# Patient Record
Sex: FEMALE | Race: WHITE | NOT HISPANIC OR LATINO | Employment: UNEMPLOYED | URBAN - METROPOLITAN AREA
[De-identification: names, ages, dates, MRNs, and addresses within clinical notes are randomized per-mention and may not be internally consistent; named-entity substitution may affect disease eponyms.]

---

## 2017-05-03 ENCOUNTER — HOSPITAL ENCOUNTER (EMERGENCY)
Facility: CLINIC | Age: 42
Discharge: HOME | End: 2017-05-03
Attending: FAMILY MEDICINE | Admitting: FAMILY MEDICINE
Payer: COMMERCIAL

## 2017-05-03 VITALS
RESPIRATION RATE: 16 BRPM | OXYGEN SATURATION: 99 % | WEIGHT: 230 LBS | BODY MASS INDEX: 38.87 KG/M2 | HEART RATE: 81 BPM | DIASTOLIC BLOOD PRESSURE: 86 MMHG | TEMPERATURE: 97.8 F | SYSTOLIC BLOOD PRESSURE: 148 MMHG

## 2017-05-03 DIAGNOSIS — G43.109 MIGRAINE WITH AURA AND WITHOUT STATUS MIGRAINOSUS, NOT INTRACTABLE: ICD-10-CM

## 2017-05-03 LAB
ANION GAP SERPL CALCULATED.3IONS-SCNC: 5 MMOL/L (ref 3–14)
BASOPHILS # BLD AUTO: 0 10E9/L (ref 0–0.2)
BASOPHILS NFR BLD AUTO: 0.2 %
BUN SERPL-MCNC: 8 MG/DL (ref 7–30)
CALCIUM SERPL-MCNC: 9.2 MG/DL (ref 8.5–10.1)
CHLORIDE SERPL-SCNC: 111 MMOL/L (ref 94–109)
CO2 SERPL-SCNC: 25 MMOL/L (ref 20–32)
CREAT SERPL-MCNC: 0.63 MG/DL (ref 0.52–1.04)
DIFFERENTIAL METHOD BLD: NORMAL
EOSINOPHIL # BLD AUTO: 0.1 10E9/L (ref 0–0.7)
EOSINOPHIL NFR BLD AUTO: 0.9 %
ERYTHROCYTE [DISTWIDTH] IN BLOOD BY AUTOMATED COUNT: 12.5 % (ref 10–15)
GFR SERPL CREATININE-BSD FRML MDRD: ABNORMAL ML/MIN/1.7M2
GLUCOSE SERPL-MCNC: 111 MG/DL (ref 70–99)
HCT VFR BLD AUTO: 41.8 % (ref 35–47)
HGB BLD-MCNC: 14.1 G/DL (ref 11.7–15.7)
IMM GRANULOCYTES # BLD: 0 10E9/L (ref 0–0.4)
IMM GRANULOCYTES NFR BLD: 0.2 %
LYMPHOCYTES # BLD AUTO: 1.5 10E9/L (ref 0.8–5.3)
LYMPHOCYTES NFR BLD AUTO: 16.5 %
MCH RBC QN AUTO: 30.9 PG (ref 26.5–33)
MCHC RBC AUTO-ENTMCNC: 33.7 G/DL (ref 31.5–36.5)
MCV RBC AUTO: 92 FL (ref 78–100)
MONOCYTES # BLD AUTO: 0.3 10E9/L (ref 0–1.3)
MONOCYTES NFR BLD AUTO: 3.2 %
NEUTROPHILS # BLD AUTO: 7.2 10E9/L (ref 1.6–8.3)
NEUTROPHILS NFR BLD AUTO: 79 %
NRBC # BLD AUTO: 0 10*3/UL
NRBC BLD AUTO-RTO: 0 /100
PLATELET # BLD AUTO: 282 10E9/L (ref 150–450)
POTASSIUM SERPL-SCNC: 4.1 MMOL/L (ref 3.4–5.3)
RBC # BLD AUTO: 4.57 10E12/L (ref 3.8–5.2)
SODIUM SERPL-SCNC: 141 MMOL/L (ref 133–144)
WBC # BLD AUTO: 9.1 10E9/L (ref 4–11)

## 2017-05-03 PROCEDURE — 85025 COMPLETE CBC W/AUTO DIFF WBC: CPT | Performed by: FAMILY MEDICINE

## 2017-05-03 PROCEDURE — 96375 TX/PRO/DX INJ NEW DRUG ADDON: CPT | Performed by: FAMILY MEDICINE

## 2017-05-03 PROCEDURE — 99284 EMERGENCY DEPT VISIT MOD MDM: CPT | Mod: Z6 | Performed by: FAMILY MEDICINE

## 2017-05-03 PROCEDURE — 96374 THER/PROPH/DIAG INJ IV PUSH: CPT | Performed by: FAMILY MEDICINE

## 2017-05-03 PROCEDURE — 99284 EMERGENCY DEPT VISIT MOD MDM: CPT | Mod: 25 | Performed by: FAMILY MEDICINE

## 2017-05-03 PROCEDURE — 36415 COLL VENOUS BLD VENIPUNCTURE: CPT | Performed by: FAMILY MEDICINE

## 2017-05-03 PROCEDURE — 96361 HYDRATE IV INFUSION ADD-ON: CPT | Performed by: FAMILY MEDICINE

## 2017-05-03 PROCEDURE — 25000128 H RX IP 250 OP 636: Performed by: FAMILY MEDICINE

## 2017-05-03 PROCEDURE — 80048 BASIC METABOLIC PNL TOTAL CA: CPT | Performed by: FAMILY MEDICINE

## 2017-05-03 RX ORDER — SODIUM CHLORIDE 9 MG/ML
1000 INJECTION, SOLUTION INTRAVENOUS CONTINUOUS
Status: DISCONTINUED | OUTPATIENT
Start: 2017-05-03 | End: 2017-05-03 | Stop reason: HOSPADM

## 2017-05-03 RX ORDER — KETOROLAC TROMETHAMINE 30 MG/ML
30 INJECTION, SOLUTION INTRAMUSCULAR; INTRAVENOUS ONCE
Status: COMPLETED | OUTPATIENT
Start: 2017-05-03 | End: 2017-05-03

## 2017-05-03 RX ORDER — PROCHLORPERAZINE MALEATE 5 MG
5 TABLET ORAL EVERY 6 HOURS PRN
Qty: 10 TABLET | Refills: 0 | Status: SHIPPED | OUTPATIENT
Start: 2017-05-03 | End: 2020-12-07

## 2017-05-03 RX ADMIN — PROCHLORPERAZINE EDISYLATE 10 MG: 5 INJECTION INTRAMUSCULAR; INTRAVENOUS at 10:58

## 2017-05-03 RX ADMIN — KETOROLAC TROMETHAMINE 30 MG: 30 INJECTION, SOLUTION INTRAMUSCULAR at 10:52

## 2017-05-03 RX ADMIN — SODIUM CHLORIDE 1000 ML: 9 INJECTION, SOLUTION INTRAVENOUS at 10:50
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
## 2017-05-03 NOTE — DISCHARGE INSTRUCTIONS
Thank you for choosing [facility].     Please closely monitor for further symptoms. Return to the Emergency Department if you develop any new or worsening signs or symptoms.    If you received any opiate pain medications or sedatives during your visit, please do not drive for at least 8 hours.     Labs, cultures or final xray interpretations may still need to be reviewed.  We will call you if your plan of care needs to be changed.    Please follow up with your primary care physician or clinic.      * Migraine Headache  Migraine headaches are related to changes in blood flow to the brain. This causes throbbing or constant pain on one or both sides of the head. The pain may last from a few hours to several days. There is usually nausea, vomiting, sensitivity to light and sound, and blurred vision. A migraine attack may be triggered by emotional stress, hormone changes during the menstrual cycle, oral contraceptives, alcohol use, certain foods containing tyramine, eye strain, weather changes, missing meals, or too little or too much sleep.  Home Care For This Headache:  1) If you were given pain medicine for this headache, do not drive yourself home . Arrange for a ride, instead. When you get home, try to sleep. You should feel much better when you wake up.  2) Migraine headaches may improve with an ice pack on the forehead or at the base of the skull. Heat to the back of your neck may relieve any neck spasm.  3) Drink only clear liquids or eat a very light diet to avoid nausea/vomiting until symptoms improve.  Preventing Future Headaches:  1) Pay attention to those factors that seem to trigger your headache. Try to avoid them when you can. If you have frequent headaches, it is useful to keep a diary of what you were doing, feeling or eating in the hours before each attack. Show this to your doctor to help find the cause of your headaches.  a) If you feel that stress is a factor in your  headaches, look at the sources of stress in your life. Find ways to release the build-up of those stresses by using regular exercise, relaxation methods (yoga, meditation), bio-feedback or simply taking time-out for yourself. For more information about this, consult your doctor or go to a local bookstore and review books and tapes on this subject.  b) Tyramine is a substance present in the following foods : chocolate, yogurt, all cheeses except cottage cheese and cream cheese. smoked or pickled fish and meat (including herring, caviar, bologna, pepperoni, salami), liver, avocados, bananas, figs, raisins, and red wine. Be aware that these foods may trigger a migraine in some persons. Try taking these foods out of your diet for 1-2 months to see if this reduces headache frequency.  Treating Future Attacks:  1) At the first sign of a migraine headache, take a medicine to stop it if one has been prescribed for you. If not, take acetaminophen (Tylenol) or ibuprofen (Motrin, Advil) if you are able to take these. The sooner you take medicine, the better it will work.  2) You may also want to find a quiet, dark, comfortable place to sit or lie down. Let yourself relax or sleep.  3) An ice pack on the forehead or area of greatest pain may also help.  Follow Up  with your doctor if the headache is not better within the next 24 hours. If you have frequent headaches you should discuss a treatment plan with your primary care doctor. Ask if you can have medicine to take at home the next time you get a bad headache. Poorly controlled chronic headaches may require a referral to a neurologist (headache specialist).  Get Prompt Medical Attention  if any of the following occur:    Your head pain gets worse, or does not improve within 24 hours    Repeated vomiting (can t keep liquids down)    Sinus or ear or throat pain (not already reported)    Fever of 101  F (38.3  C) or higher, or as directed by your healthcare provider    Stiff  neck    Extreme drowsiness, confusion or fainting    Weakness of an arm or leg or one side of the face    Difficulty with speech or vision    6164-8318 [employer]. [address] [place] [address], [place] [address]. All rights reserved. This information is not intended as a substitute for professional medical care. Always follow your healthcare professional's instructions.

## 2017-05-03 NOTE — ED AVS SNAPSHOT
[facility], Emergency Department    [address]    [facility] [address]    Phone:  [phone]    Fax:  [phone]                                       Sabrina Mccoy   MRN: [id number]    Department:  [facility], Emergency Department   Date of Visit:  5/3/2017           After Visit Summary Signature Page     I have received my discharge instructions, and my questions have been answered. I have discussed any challenges I see with this plan with the nurse or doctor.    ..........................................................................................................................................  Patient/Patient Representative Signature      ..........................................................................................................................................  Patient Representative Print Name and Relationship to Patient    ..................................................               ................................................  Date                                            Time    ..........................................................................................................................................  Reviewed by Signature/Title    ...................................................              ..............................................  Date                                                            Time

## 2017-05-03 NOTE — ED AVS SNAPSHOT
[facility], Emergency Department    [address]    [facility] [place] [address]    Phone:  [phone]    Fax:  [phone]                                       Sabrina Mccoy   MRN: [id number]    Department:  [facility], Emergency Department   Date of Visit:  5/3/2017           Patient Information     Date Of Birth          1975        Your diagnoses for this visit were:     Migraine with aura and without status migrainosus, not intractable        You were seen by Joshua Evangelista MD.        Discharge Instructions       Thank you for choosing [facility].     Please closely monitor for further symptoms. Return to the Emergency Department if you develop any new or worsening signs or symptoms.    If you received any opiate pain medications or sedatives during your visit, please do not drive for at least 8 hours.     Labs, cultures or final xray interpretations may still need to be reviewed.  We will call you if your plan of care needs to be changed.    Please follow up with your primary care physician or clinic.      * Migraine Headache  Migraine headaches are related to changes in blood flow to the brain. This causes throbbing or constant pain on one or both sides of the head. The pain may last from a few hours to several days. There is usually nausea, vomiting, sensitivity to light and sound, and blurred vision. A migraine attack may be triggered by emotional stress, hormone changes during the menstrual cycle, oral contraceptives, alcohol use, certain foods containing tyramine, eye strain, weather changes, missing meals, or too little or too much sleep.  Home Care For This Headache:  1) If you were given pain medicine for this headache, do not drive yourself home . Arrange for a ride, instead. When you get home, try to sleep. You should feel much better when you wake up.  2) Migraine headaches may improve with an ice pack on the forehead or at the base of the skull. Heat  to the back of your neck may relieve any neck spasm.  3) Drink only clear liquids or eat a very light diet to avoid nausea/vomiting until symptoms improve.  Preventing Future Headaches:  1) Pay attention to those factors that seem to trigger your headache. Try to avoid them when you can. If you have frequent headaches, it is useful to keep a diary of what you were doing, feeling or eating in the hours before each attack. Show this to your doctor to help find the cause of your headaches.  a) If you feel that stress is a factor in your headaches, look at the sources of stress in your life. Find ways to release the build-up of those stresses by using regular exercise, relaxation methods (yoga, meditation), bio-feedback or simply taking time-out for yourself. For more information about this, consult your doctor or go to a local bookstore and review books and tapes on this subject.  b) Tyramine is a substance present in the following foods : chocolate, yogurt, all cheeses except cottage cheese and cream cheese. smoked or pickled fish and meat (including herring, caviar, bologna, pepperoni, salami), liver, avocados, bananas, figs, raisins, and red wine. Be aware that these foods may trigger a migraine in some persons. Try taking these foods out of your diet for 1-2 months to see if this reduces headache frequency.  Treating Future Attacks:  1) At the first sign of a migraine headache, take a medicine to stop it if one has been prescribed for you. If not, take acetaminophen (Tylenol) or ibuprofen (Motrin, Advil) if you are able to take these. The sooner you take medicine, the better it will work.  2) You may also want to find a quiet, dark, comfortable place to sit or lie down. Let yourself relax or sleep.  3) An ice pack on the forehead or area of greatest pain may also help.  Follow Up  with your doctor if the headache is not better within the next 24 hours. If you have frequent headaches you should discuss a treatment  plan with your primary care doctor. Ask if you can have medicine to take at home the next time you get a bad headache. Poorly controlled chronic headaches may require a referral to a neurologist (headache specialist).  Get Prompt Medical Attention  if any of the following occur:    Your head pain gets worse, or does not improve within 24 hours    Repeated vomiting (can t keep liquids down)    Sinus or ear or throat pain (not already reported)    Fever of 101  F (38.3  C) or higher, or as directed by your healthcare provider    Stiff neck    Extreme drowsiness, confusion or fainting    Weakness of an arm or leg or one side of the face    Difficulty with speech or vision    0331-9822 [employer]. [address] [place] [address], [place] [address]. All rights reserved. This information is not intended as a substitute for professional medical care. Always follow your healthcare professional's instructions.          24 Hour Appointment Hotline       To make an appointment at any [facility], call [phone] ([phone]). If you don't have a family doctor or clinic, we will help you find one. [facility] clinics are conveniently located to serve the needs of you and your family.             Review of your medicines      START taking        Dose / Directions Last dose taken    prochlorperazine 5 MG tablet   Commonly known as:  COMPAZINE   Dose:  5 mg   Quantity:  10 tablet        Take 1 tablet (5 mg) by mouth every 6 hours as needed for nausea or vomiting   Refills:  0          Our records show that you are taking the medicines listed below. If these are incorrect, please call your family doctor or clinic.        Dose / Directions Last dose taken    amitriptyline 10 MG tablet   Commonly known as:  ELAVIL   Quantity:  90 tablet        Start at 1 tab at bedtime for 3 days, then 2 tabs at bedtime for 3 days, then 3 tabs at bedtime.  Plan to increase dose to 50-75 mg at bedtime after 1 month   Refills:  0         hydrOXYzine 25 MG tablet   Commonly known as:  ATARAX   Dose:   mg   Quantity:  30 tablet        Take 2-4 tablets ( mg) by mouth nightly as needed for itching   Refills:  1        ibuprofen 600 MG tablet   Commonly known as:  ADVIL/MOTRIN   Dose:  600 mg   Quantity:  30 tablet        Take 1 tablet (600 mg) by mouth every 6 hours as needed for pain (mild)   Refills:  0                Prescriptions were sent or printed at these locations (1 Prescription)                   Other Prescriptions                Printed at Department/Unit printer (1 of 1)         prochlorperazine (COMPAZINE) 5 MG tablet                Procedures and tests performed during your visit     Basic metabolic panel    CBC with platelets differential      Orders Needing Specimen Collection     None      Pending Results     No orders found from 5/1/2017 to 5/4/2017.            Pending Culture Results     No orders found from 5/1/2017 to 5/4/2017.            Pending Results Instructions     If you had any lab results that were not finalized at the time of your Discharge, you can call the ED Lab Result RN at [phone]. You will be contacted by this team for any positive Lab results or changes in treatment. The nurses are available 7 days a week from 10A to 6:30P.  You can leave a message 24 hours per day and they will return your call.        Thank you for choosing [facility]       Thank you for choosing [facility] for your care. Our goal is always to provide you with excellent care. Hearing back from our patients is one way we can continue to improve our services. Please take a few minutes to complete the written survey that you may receive in the mail after you visit with us. Thank you!        [employer] Information     [facility] [employer] gives you secure access to your electronic health record. If you see a primary care provider, you can also send messages to your care team and make appointments. If you have questions, please call your primary care  clinic.  If you do not have a primary care provider, please call [phone] and they will assist you.        Care EveryWhere ID     This is your Care EveryWhere ID. This could be used by other organizations to access your [facility] medical records  [id number]        After Visit Summary       This is your record. Keep this with you and show to your community pharmacist(s) and doctor(s) at your next visit.

## 2018-03-31 ENCOUNTER — HEALTH MAINTENANCE LETTER (OUTPATIENT)
Age: 43
End: 2018-03-31

## 2019-04-03 ENCOUNTER — OFFICE VISIT (OUTPATIENT)
Dept: FAMILY MEDICINE | Facility: CLINIC | Age: 44
End: 2019-04-03
Payer: COMMERCIAL

## 2019-04-03 VITALS
DIASTOLIC BLOOD PRESSURE: 89 MMHG | HEIGHT: 64 IN | RESPIRATION RATE: 18 BRPM | TEMPERATURE: 98.2 F | HEART RATE: 80 BPM | SYSTOLIC BLOOD PRESSURE: 147 MMHG | BODY MASS INDEX: 40.29 KG/M2 | WEIGHT: 236 LBS | OXYGEN SATURATION: 97 %

## 2019-04-03 DIAGNOSIS — J10.1 INFLUENZA A: Primary | ICD-10-CM

## 2019-04-03 DIAGNOSIS — B00.1 COLD SORE: ICD-10-CM

## 2019-04-03 DIAGNOSIS — R05.9 COUGH: ICD-10-CM

## 2019-04-03 DIAGNOSIS — R07.0 THROAT PAIN: ICD-10-CM

## 2019-04-03 LAB
DEPRECATED S PYO AG THROAT QL EIA: NORMAL
FLUAV+FLUBV AG SPEC QL: NEGATIVE
FLUAV+FLUBV AG SPEC QL: POSITIVE
SPECIMEN SOURCE: ABNORMAL
SPECIMEN SOURCE: NORMAL

## 2019-04-03 PROCEDURE — 87804 INFLUENZA ASSAY W/OPTIC: CPT | Mod: 59 | Performed by: FAMILY MEDICINE

## 2019-04-03 PROCEDURE — 87880 STREP A ASSAY W/OPTIC: CPT | Performed by: FAMILY MEDICINE

## 2019-04-03 PROCEDURE — 99213 OFFICE O/P EST LOW 20 MIN: CPT | Performed by: FAMILY MEDICINE

## 2019-04-03 PROCEDURE — 87081 CULTURE SCREEN ONLY: CPT | Performed by: FAMILY MEDICINE

## 2019-04-03 RX ORDER — PENCICLOVIR 10 MG/G
CREAM TOPICAL
Qty: 1.5 G | Refills: 3 | Status: SHIPPED | OUTPATIENT
Start: 2019-04-03

## 2019-04-03 RX ORDER — OSELTAMIVIR PHOSPHATE 75 MG/1
75 CAPSULE ORAL 2 TIMES DAILY
Qty: 10 CAPSULE | Refills: 0 | Status: SHIPPED | OUTPATIENT
Start: 2019-04-03 | End: 2019-04-08

## 2019-04-03 ASSESSMENT — MIFFLIN-ST. JEOR: SCORE: 1715.49

## 2019-04-03 NOTE — PROGRESS NOTES
SUBJECTIVE:   Sabrina Mccoy is a 43 year old female who presents to clinic today for the following health issues:    Acute Illness   Acute illness concerns: coughing   Onset: x 4 days     Fever: no     Chills/Sweats: YES    Headache (location?): YES    Sinus Pressure:Yes    Conjunctivitis:  no    Ear Pain: no    Rhinorrhea: no     Congestion: YES    Sore Throat: YES     Cough: YES    Wheeze: YES    Decreased Appetite: YES    Nausea: no     Vomiting: no     Diarrhea:  YES    Dysuria/Freq.: no     Fatigue/Achiness: YES    Sick/Strep Exposure: no      Therapies Tried and outcome:  Ibuprofen           Influenza A  Cold sore  Throat pain  Cough :        Problem list, Medication list, Allergies, and Medical/Social/Surgical histories reviewed in [facility] and updated as appropriate.  Labs reviewed in EPIC  BP Readings from Last 3 Encounters:   19 147/89   17 148/86   16 136/70    Wt Readings from Last 3 Encounters:   19 107 kg (236 lb)   17 104.3 kg (230 lb)   16 105.2 kg (232 lb)                  Patient Active Problem List   Diagnosis     Arthritis     Back pain     Fungal dermatitis     Hypertension, postpartum condition or complication     CARDIOVASCULAR SCREENING; LDL GOAL LESS THAN 160     Contraception     Obesity (BMI 30-39.9)     Migraine with aura and without status migrainosus, not intractable     Past Surgical History:   Procedure Laterality Date      SECTION  2013    Procedure:  SECTION;   Section;  Surgeon: Isabell Agarwal MD;  Location: [facility] L[facility]     DILATE CERVIX, ABLATE ENDOMETRIUM NOVASURE, COMBINED N/A 2015    Procedure: COMBINED DILATE CERVIX, ABLATE ENDOMETRIUM NOVASURE;  Surgeon: Mercedes Vargas MD;  Location: [facility] OR     NO HISTORY OF SURGERY         Social History     Tobacco Use     Smoking status: Former Smoker     Smokeless tobacco: Never Used   Substance Use Topics     Alcohol use: No     Comment: not now that is pregnant      Family History   Problem Relation Age of Onset     C.A.D. Maternal Grandfather      Cancer Maternal Grandfather         lung     Diabetes Maternal Grandmother      Eye Disorder Maternal Grandmother         cataract glaucoma     Diabetes Paternal Grandmother      Eye Disorder Paternal Grandmother         cataract, glaucoma     Obesity Paternal Grandmother      Hypertension Mother      Breast Cancer Mother         age 59     Arthritis Mother      Lipids Mother      Respiratory Mother         emphasema, COPD     Arthritis Father      Obesity Father      Hypertension Maternal Aunt      Lipids Maternal Aunt      Thyroid Disease Paternal Uncle      Gynecology Sister         ablation done for heave bleeding.      Gynecology Other         ablation done for heavy bleeding          Current Outpatient Medications   Medication Sig Dispense Refill     ibuprofen (ADVIL,MOTRIN) 600 MG tablet Take 1 tablet (600 mg) by mouth every 6 hours as needed for pain (mild) 30 tablet 0     oseltamivir (TAMIFLU) 75 MG capsule Take 1 capsule (75 mg) by mouth 2 times daily for 5 days 10 capsule 0     penciclovir (DENAVIR) 1 % external cream Apply topically every 2 hours 1.5 g 3     amitriptyline (ELAVIL) 10 MG tablet Start at 1 tab at bedtime for 3 days, then 2 tabs at bedtime for 3 days, then 3 tabs at bedtime.  Plan to increase dose to 50-75 mg at bedtime after 1 month (Patient not taking: Reported on 4/3/2019) 90 tablet 0     hydrOXYzine (ATARAX) 25 MG tablet Take 2-4 tablets ( mg) by mouth nightly as needed for itching (Patient not taking: Reported on 4/3/2019) 30 tablet 1     prochlorperazine (COMPAZINE) 5 MG tablet Take 1 tablet (5 mg) by mouth every 6 hours as needed for nausea or vomiting (Patient not taking: Reported on 4/3/2019) 10 tablet 0     Allergies   Allergen Reactions     No Known Drug Allergies      Recent Labs   Lab Test 05/03/17  1053 07/11/16  1524 01/15/15  0904  04/13/13  0645 04/12/13  1407   LDL  --  133* 154*   "--   --   --    HDL  --  34* 36*  --   --   --    TRIG  --  247* 159*  --   --   --    ALT  --  27  --   --  35 28   CR 0.63 0.64  --    < > 0.70 0.75   GFRESTIMATED >90  Non  GFR Calc   >90  Non  GFR Calc    --    < > >90 87   GFRESTBLACK >90   GFR Calc   >90   GFR Calc    --    < > >90 >90   POTASSIUM 4.1 4.0  --    < > 4.5  --    TSH  --  2.16 1.15  --   --   --     < > = values in this interval not displayed.        ROS:  Constitutional, HEENT, cardiovascular, pulmonary, GI, , musculoskeletal, neuro, skin, endocrine and psych systems are negative, except as otherwise noted.        OBJECTIVE:  /89 (BP Location: Left arm, Patient Position: Sitting, Cuff Size: Adult Large)   Pulse 80   Temp 98.2  F (36.8  C) (Oral)   Resp 18   Ht 1.626 m (5' 4\")   Wt 107 kg (236 lb)   SpO2 97%   BMI 40.51 kg/m      EXAM:  GENERAL APPEARANCE: healthy, alert and no distress  Nasal congestions, tympanic membranes clear  RESP: lungs clear to auscultation - no rales, rhonchi or wheezes  CV: regular rates and rhythm, normal S1 S2, no S3 or S4 and no murmur, click or rub -  ABDOMEN:  soft, nontender, no HSM or masses and bowel sounds normal      ASSESSMENT AND PLAN  Patient Instructions   ASSESSMENT AND PLAN  1. Influenza A  Ibuprofen for fevers.      Treatments as below.      Follow up as needed and for physical/pap smear.     - oseltamivir (TAMIFLU) 75 MG capsule; Take 1 capsule (75 mg) by mouth 2 times daily for 5 days  Dispense: 10 capsule; Refill: 0    2. Cold sore    - penciclovir (DENAVIR) 1 % external cream; Apply topically every 2 hours  Dispense: 1.5 g; Refill: 3    3. Throat pain    - Strep, Rapid Screen  - Influenza A/B antigen  - Beta strep group A culture    4. Cough          MYCHART FOR ON-LINE CARE(VISITS), LABS, REFILLS, MESSAGING, ETC [email or web address] , [phone]    E-VISIT: click \"on-line care, then request e-visit\".  " E-visits work well for following up on issues we have discussed in clinic previously which may need new prescriptions, new prescriptions or substantial discussion. These are always done by me (Dr. Wegener).     ONCARE VISIT:  [email or web address]  - we treat nearly 50 common conditions through on-care.  These are done in an hour by on-call staff.     RADIOLOGY:  [facility]:  [phone]   [facility]: [phone]    Mammogram and Colonoscopy [phone]    Smoking Cessation: [email or web address], 7-567-592-PLAN ([phone])      CONSUMER PRICE LINE for estimates of test costs:  [phone]             Joel Wegener, MD

## 2019-04-03 NOTE — PATIENT INSTRUCTIONS
"ASSESSMENT AND PLAN  1. Influenza A  Ibuprofen for fevers.      Treatments as below.      Follow up as needed and for physical/pap smear.     - oseltamivir (TAMIFLU) 75 MG capsule; Take 1 capsule (75 mg) by mouth 2 times daily for 5 days  Dispense: 10 capsule; Refill: 0    2. Cold sore    - penciclovir (DENAVIR) 1 % external cream; Apply topically every 2 hours  Dispense: 1.5 g; Refill: 3    3. Throat pain    - Strep, Rapid Screen  - Influenza A/B antigen  - Beta strep group A culture    4. Cough          MYCHART FOR ON-LINE CARE(VISITS), LABS, REFILLS, MESSAGING, ETC [email or web address] [phone]    E-VISIT: click \"on-line care, then request e-visit\".  E-visits work well for following up on issues we have discussed in clinic previously which may need new prescriptions, new prescriptions or substantial discussion. These are always done by me (Dr. Wegener).     ONCARE VISIT:  [email or web address]  - we treat nearly 50 common conditions through on-care.  These are done in an hour by on-call staff.     RADIOLOGY:  [facility]:  [phone]   [facility]: [phone]    Mammogram and Colonoscopy [phone]    Smoking Cessation: www.[email or web address], 9-990-106-PLAN ([phone])      CONSUMER PRICE LINE for estimates of test costs:  [phone]       "

## 2019-04-03 NOTE — LETTER
[facility]  [address] [facility] [address] [phone]          April 3, 2019    RE:  Sabrina Mccoy                                                                                                                                                       [address] [facility] [address]            To whom it may concern:    Sabrina Mccoy is under my professional care for an acute illness.     Please excuse her from work tomorrow.  She may return to work as planned next Tuesday.             Joel Wegener,MD

## 2019-04-04 LAB
BACTERIA SPEC CULT: NORMAL
SPECIMEN SOURCE: NORMAL

## 2019-04-04 NOTE — RESULT ENCOUNTER NOTE
Wes Ms. Mccoy,  Your results came back negative for strep If you have any further concerns please do not hesitate to contact us by message, phone or making an appointment.  Have a good day   Dr Carlos SWAN

## 2019-09-29 ENCOUNTER — HEALTH MAINTENANCE LETTER (OUTPATIENT)
Age: 44
End: 2019-09-29

## 2020-03-15 ENCOUNTER — HEALTH MAINTENANCE LETTER (OUTPATIENT)
Age: 45
End: 2020-03-15

## 2020-11-30 ENCOUNTER — MYC MEDICAL ADVICE (OUTPATIENT)
Dept: FAMILY MEDICINE | Facility: CLINIC | Age: 45
End: 2020-11-30

## 2020-11-30 NOTE — TELEPHONE ENCOUNTER
Dr. Wegener-Please advise if you recommend virtual or in-person visit to address this request?  Patient will be asked to bring/sumbit FMLA forms for completion during visit.    Thank you!  TORRI MtzN, RN

## 2020-12-01 ENCOUNTER — E-VISIT (OUTPATIENT)
Dept: FAMILY MEDICINE | Facility: CLINIC | Age: 45
End: 2020-12-01

## 2020-12-01 DIAGNOSIS — E66.01 MORBID OBESITY (H): ICD-10-CM

## 2020-12-01 DIAGNOSIS — G89.4 CHRONIC PAIN SYNDROME: ICD-10-CM

## 2020-12-01 DIAGNOSIS — M79.7 FIBROMYALGIA: ICD-10-CM

## 2020-12-01 DIAGNOSIS — Z71.89 ADVICE GIVEN ABOUT 2019 NOVEL CORONAVIRUS INFECTION: Primary | ICD-10-CM

## 2020-12-01 PROCEDURE — 99422 OL DIG E/M SVC 11-20 MIN: CPT | Performed by: FAMILY MEDICINE

## 2020-12-03 ENCOUNTER — MYC MEDICAL ADVICE (OUTPATIENT)
Dept: FAMILY MEDICINE | Facility: CLINIC | Age: 45
End: 2020-12-03

## 2020-12-03 NOTE — LETTER
[facility]  [address] [place] [address]  [phone]        December 3, 2020      Sabrina Mccoy  [address]  [place] [address]      To whom it may concern:      I am writing to request that Ms. Sabrina Mccoy be granted a leave from work during the Covid 19 pandemic.     She has  the following conditions/diagnoses which are high risk conditions for severe COVID 19 infection.     Morbid Obesity  Chronic pain and Fibromyalgia (both of which cause immune suppression)    It is my medical opinion that she should avoid close contact with others and work from home if possible during this COVID-19 pandemic.     I understand that she works part time and I would assume would not have disability benefits to continue to pay her during this time but ask that her job would be secure when she returns.  Of course if there are benefits which would allow her salary to continue please discuss that with her.     Sincerely,             Joel Daniel Wegener, MD    [facility]

## 2020-12-07 PROBLEM — G89.4 CHRONIC PAIN SYNDROME: Status: ACTIVE | Noted: 2020-12-07

## 2020-12-07 PROBLEM — M79.7 FIBROMYALGIA: Status: ACTIVE | Noted: 2020-12-07

## 2020-12-07 PROBLEM — E66.01 MORBID OBESITY (H): Status: ACTIVE | Noted: 2020-12-07

## 2020-12-11 ENCOUNTER — MYC MEDICAL ADVICE (OUTPATIENT)
Dept: FAMILY MEDICINE | Facility: CLINIC | Age: 45
End: 2020-12-11

## 2020-12-11 NOTE — LETTER
[facility]  [address]  [facility] [address] [phone]          December 14, 2020          Sabrina Mccoy  [address]  [facility] [address]        To whom it may concern:       I am writing to request that Ms. Sabrina Mccoy be granted a leave from work during the Covid 19 pandemic for the next three months with an end date of March 15, 2021.  We  Will re-evaluate need for leave at that time as needed.      She has  the following conditions/diagnoses which are high risk conditions for severe COVID 19 infection.      Morbid Obesity  Chronic pain and Fibromyalgia (both of which cause immune suppression)     It is my medical opinion that she should avoid close contact with others and work from home if possible during this COVID-19 pandemic.      I understand that she works part time and I would assume would not have disability benefits to continue to pay her during this time but ask that her job would be secure when she returns.  Of course if there are benefits which would allow her salary to continue please discuss that with her.      Sincerely,                  Joel Daniel Wegener, MD

## 2021-01-14 ENCOUNTER — HEALTH MAINTENANCE LETTER (OUTPATIENT)
Age: 46
End: 2021-01-14

## 2021-02-19 ENCOUNTER — MYC MEDICAL ADVICE (OUTPATIENT)
Dept: FAMILY MEDICINE | Facility: CLINIC | Age: 46
End: 2021-02-19

## 2021-04-01 ENCOUNTER — TELEPHONE (OUTPATIENT)
Dept: FAMILY MEDICINE | Facility: CLINIC | Age: 46
End: 2021-04-01

## 2021-04-01 NOTE — TELEPHONE ENCOUNTER
Patient Quality Outreach 2nd Attempt      Summary:    Type of outreach:    Sent [employer] message.    Next Steps:  Reach out within 90 days via Phone.    Max number of attempts reached: No. Will try again in 90 days if patient still on fail list.    Questions for provider review:    None                                                                                                                    Nicolasa Georges MA on 4/1/2021 at 12:49 PM       Chart routed to .

## 2021-10-23 ENCOUNTER — HEALTH MAINTENANCE LETTER (OUTPATIENT)
Age: 46
End: 2021-10-23

## 2022-02-12 ENCOUNTER — HEALTH MAINTENANCE LETTER (OUTPATIENT)
Age: 47
End: 2022-02-12

## 2022-10-10 ENCOUNTER — HEALTH MAINTENANCE LETTER (OUTPATIENT)
Age: 47
End: 2022-10-10

## 2022-11-27 ENCOUNTER — OFFICE VISIT (OUTPATIENT)
Dept: FAMILY MEDICINE | Facility: CLINIC | Age: 47
End: 2022-11-27
Payer: COMMERCIAL

## 2022-11-27 ENCOUNTER — NURSE TRIAGE (OUTPATIENT)
Dept: NURSING | Facility: CLINIC | Age: 47
End: 2022-11-27

## 2022-11-27 VITALS
WEIGHT: 205 LBS | OXYGEN SATURATION: 97 % | HEART RATE: 64 BPM | DIASTOLIC BLOOD PRESSURE: 81 MMHG | BODY MASS INDEX: 35.19 KG/M2 | SYSTOLIC BLOOD PRESSURE: 187 MMHG | TEMPERATURE: 98.2 F

## 2022-11-27 DIAGNOSIS — B02.9 HERPES ZOSTER WITHOUT COMPLICATION: Primary | ICD-10-CM

## 2022-11-27 PROCEDURE — 99213 OFFICE O/P EST LOW 20 MIN: CPT | Performed by: PHYSICIAN ASSISTANT

## 2022-11-27 RX ORDER — VALACYCLOVIR HYDROCHLORIDE 1 G/1
1000 TABLET, FILM COATED ORAL 3 TIMES DAILY
Qty: 21 TABLET | Refills: 0 | Status: SHIPPED | OUTPATIENT
Start: 2022-11-27 | End: 2023-04-25

## 2022-11-27 RX ORDER — GABAPENTIN 100 MG/1
100 CAPSULE ORAL 3 TIMES DAILY
Qty: 30 CAPSULE | Refills: 0 | Status: SHIPPED | OUTPATIENT
Start: 2022-11-27 | End: 2023-02-01

## 2022-11-27 NOTE — PATIENT INSTRUCTIONS
"1. Take Valcyclovir 1000 mg three times daily for a total of 7 days.  2. For pain I recommend over the counter pain medications such as Tylenol or Ibuprofen. Take Tylenol up to 1000 mg every  6 hours or ibuprofen 600mg every 6 hours by mouth for pain.  Do not exceed 4000mg of acetaminophen or 2400mg of ibuprofen from any source in a 24 hour period.  Taking Tylenol and ibuprofen together may be helpful in reducing pain. May also take Gabapentin up to 3 times per day.   3. Follow up if eye or inner ear involvement.       What is shingles? -- Shingles is a painful rash that is shaped like a band or a belt. Shingles can affect people of all ages, but it is most common in those older than 50. Another name for shingles is \"herpes zoster.\"    What causes shingles? -- Shingles is caused by the same virus that causes chickenpox. After someone has chickenpox, the virus sometimes hides out, \"asleep\" in the body. Years later, it can \"wake up\" and cause shingles. The first time a person is infected with that virus, he or she gets chickenpox, not shingles.    Is shingles contagious? -- Yes and no. It is NOT possible to \"catch\" shingles from someone who has the rash. But it is possible to \"catch\" the virus and then get sick with chickenpox. Shingles and chickenpox are caused by the same virus.  You probably will NOT catch the virus (or get chickenpox) if you:  ?Had chickenpox or shingles in the past  ?Had the chickenpox vaccine  ?Were born in the [place] before 1980 (most people born before 1980 have had chickenpox even if they don't remember it)  If you have never had chickenpox or the chickenpox vaccine, be careful around anyone with shingles. Do not touch their rash. If you do, you could get sick with chickenpox. In rare cases, people can even get chickenpox from just being near someone with shingles. This is most likely in people who cannot fight infections well (immunocompromised individuals)    What are the symptoms of " "shingles? -- At first, shingles causes weird sensations on your skin. You might feel itching, burning, pain, or tingling. Some people get a fever, feel sick, or get a headache. Within 1 to 2 days, a rash with blisters appears. Blisters most often appear in a band across the chest and back. They can show up on other parts of the body, too. The blisters cause pain that can be mild or severe.  Within 3 to 4 days, shingles blisters can become open sores or \"ulcers\". These ulcers can get infected. Within 7 to 10 days, the rash should scab over. By then, most people are no longer contagious.    Can shingles be serious? -- Yes. Shingles can be serious, but that is rare. About 1 out of 10 people with shingles will get something called \"postherpetic neuralgia,\" or \"PHN.\" People with PHN keep feeling pain or discomfort even after their rash goes away. This pain can last for months or even years. It can be so bad that it makes it hard to sleep, causes weight loss, and leads to depression.  Shingles can also cause:  ?Skin infections  ?Eye problems (if the rash is near the eye)  ?Ear problems (if the rash is near the ear)  ?Dangerous infections in people who have other health problems    Should I be treated? -- If you see your doctor or nurse within 3 days of when your symptoms start, yes. He or she should give you medicines to help you get rid of the virus. These medicines are called anti-virals. They can speed your recovery and reduce the chances that you will have shingles-related problems such as PHN.    Can the pain be treated? -- Some people can deal with their pain with non-prescription pain medicines, but most people need prescription medicines.  How should I take care of the rash? -- Keep the parts of your skin that have a rash clean and dry. Do not use creams or gels unless your doctor or nurse says you should.    Can shingles be prevented? -- People can reduce their chances of getting shingles by getting the shingles " vaccine. The vaccine can also make the symptoms of shingles milder if they do occur. Experts recommend that most people age 60 and older should get the shingles vaccine. But, some people who have problems with their immune system should not get the vaccine.
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
## 2022-11-27 NOTE — TELEPHONE ENCOUNTER
Hit her head on Tuesday and has a headache    Was reaching into [place] and hit her head.    There is a red spot on her forehead/eyebrow that she noticed on Wed    Denies a fever    It is painful to move her eyebrow    Possible shingles?    Advised that she be seen today to have someone assess the rash.    The patient indicates understanding of these issues and agrees with the plan.    Pita Snyder RN  [place] Nurse Advisor  9:38 AM  11/27/2022      Reason for Disposition    [1] Localized rash is very painful AND [2] no fever    Additional Information    Negative: [1] Sudden onset of rash (within last 2 hours) AND [2] difficulty breathing or swallowing    Negative: Sounds like a life-threatening emergency to the triager    Negative: [1] Localized purple or blood-colored spots or dots AND [2] not from injury or friction AND [3] fever    Negative: Patient sounds very sick or weak to the triager    Negative: [1] Red area or streak AND [2] fever    Negative: [1] Rash is painful to touch AND [2] fever    Negative: [1] Looks infected (spreading redness, pus) AND [2] large red area (> 2 in. or 5 cm)    Negative: [1] Looks infected (spreading redness, pus) AND [2] diabetes mellitus or weak immune system (e.g., HIV positive, cancer chemo, splenectomy, organ transplant, chronic steroids)    Negative: [1] Localized purple or blood-colored spots or dots AND [2] not from injury or friction AND [3] no fever    Negative: [1] Looks infected (spreading redness, pus) AND [2] no fever    Negative: Looks like a boil, infected sore, deep ulcer or other infected rash    Protocols used: RASH OR REDNESS - [id number]

## 2022-11-29 ENCOUNTER — OFFICE VISIT (OUTPATIENT)
Dept: OPHTHALMOLOGY | Facility: CLINIC | Age: 47
End: 2022-11-29
Attending: OPHTHALMOLOGY
Payer: COMMERCIAL

## 2022-11-29 ENCOUNTER — TELEPHONE (OUTPATIENT)
Dept: OPHTHALMOLOGY | Facility: CLINIC | Age: 47
End: 2022-11-29

## 2022-11-29 DIAGNOSIS — B02.30 HERPES ZOSTER OPHTHALMICUS OF LEFT EYE: Primary | ICD-10-CM

## 2022-11-29 DIAGNOSIS — H40.003 GLAUCOMA SUSPECT OF BOTH EYES: ICD-10-CM

## 2022-11-29 PROCEDURE — G0463 HOSPITAL OUTPT CLINIC VISIT: HCPCS

## 2022-11-29 PROCEDURE — 92004 COMPRE OPH EXAM NEW PT 1/>: CPT | Performed by: OPHTHALMOLOGY

## 2022-11-29 RX ORDER — LATANOPROST 50 UG/ML
1 SOLUTION/ DROPS OPHTHALMIC AT BEDTIME
COMMUNITY
Start: 2022-10-05

## 2022-11-29 ASSESSMENT — TONOMETRY
OD_IOP_MMHG: 20
OS_IOP_MMHG: 22
IOP_METHOD: ICARE

## 2022-11-29 ASSESSMENT — CONF VISUAL FIELD
OD_INFERIOR_TEMPORAL_RESTRICTION: 0
OS_SUPERIOR_NASAL_RESTRICTION: 0
OD_INFERIOR_NASAL_RESTRICTION: 0
OD_SUPERIOR_NASAL_RESTRICTION: 0
OS_SUPERIOR_TEMPORAL_RESTRICTION: 0
OD_NORMAL: 1
OS_INFERIOR_NASAL_RESTRICTION: 0
OS_INFERIOR_TEMPORAL_RESTRICTION: 0
METHOD: COUNTING FINGERS
OS_NORMAL: 1
OD_SUPERIOR_TEMPORAL_RESTRICTION: 0

## 2022-11-29 ASSESSMENT — VISUAL ACUITY
OS_CC: 20/30
METHOD: SNELLEN - LINEAR
OD_CC: 20/25
CORRECTION_TYPE: GLASSES
OS_PH_CC: 20/25

## 2022-11-29 ASSESSMENT — CUP TO DISC RATIO
OS_RATIO: 0.35
OD_RATIO: 0.3

## 2022-11-29 ASSESSMENT — REFRACTION_WEARINGRX
OS_SPHERE: -4.75
OS_AXIS: 080
OS_CYLINDER: +0.75
OD_CYLINDER: +0.75
OD_SPHERE: -5.50
OD_AXIS: 090

## 2022-11-29 ASSESSMENT — EXTERNAL EXAM - RIGHT EYE: OD_EXAM: NORMAL

## 2022-11-29 ASSESSMENT — SLIT LAMP EXAM - LIDS: COMMENTS: NORMAL

## 2022-11-29 NOTE — NURSING NOTE
Chief Complaints and History of Present Illnesses   Patient presents with     New Patient     Chief Complaint(s) and History of Present Illness(es)     New Patient            Laterality: left eye    Onset: 6 days ago    Associated symptoms: swelling and foreign body sensation.  Negative for flashes and floaters    Treatments tried: eye drops    Pain scale: 1/10          Comments    New patient presents with shingles near left eye.  The patient reports the shingles started on her forehead six days ago.  Today, she notes FB sensation in the left eye, pain around her left eye and ANDREI swelling.  The patient is on Valtex 1000mg three times daily.  She also has Gabapentin as needed.  She uses Latanoprost at bedtime in both eyes.  Velia Vickers, COA, COA 12:50 PM 11/29/2022

## 2022-11-29 NOTE — TELEPHONE ENCOUNTER
New shingles close to eye and having new swelling around eye/eyelids    Scheduled at 1330 today with Dr. Agarwal    Pt aware of date/time/location at [facility]    Bentley Martínez RN 9:58 AM 11/29/22          [facility]    Phone Message    May a detailed message be left on voicemail: yes     Reason for Call: Appointment Intake    Referring Provider Name: n/a  Diagnosis and/or Symptoms: Shingles. Pt has shingles and sates it's starting to get closer to her eyes (on left eyelids), area is swollen as well.  Due to protocol writer is to sent high priority te to clinic for scheduling.    Pt states if she's unavailable please reach out to her .    Thank You!    Action Taken: Message routed to:  [facility]    Travel Screening: Not Applicable

## 2022-11-29 NOTE — PROGRESS NOTES
Chief Complaint(s) and History of Present Illness(es)     New Patient            Laterality: left eye    Onset: 6 days ago    Associated symptoms: swelling and foreign body sensation.  Negative for   flashes and floaters    Treatments tried: eye drops    Pain scale: 1/10          Comments    New patient presents with shingles near left eye.  The patient reports the shingles started on her forehead six days ago.  Today, she notes FB sensation in the left eye, pain around her left eye   and ANDREI swelling.  The patient is on Valtex 1000mg three times daily.  She also has   Gabapentin as needed.  She uses Latanoprost at bedtime in both eyes.  Velia Vickers, COA, COA 12:50 PM 11/29/2022           Review of systems for the eyes was negative other than the pertinent positives/negatives listed in the HPI.      Assessment & Plan      Sabrina Mccoy is a 46 year old female with the following diagnoses:   1. Herpes zoster ophthalmicus of left eye    2. Glaucoma suspect of both eyes         New patient here with HZO left side  No ocular involvement on dilated fundus exam   Reassurance given  Continue vatrex 1000 mg orally three times a day as prescribed  Continue gabapentin.  Manage neuropathic pain with primary care physician as needed   Return precautions reviewed   Ok to continue latanoprost at this time  Records requested for ongoing glaucoma care    Patient disposition:   Return in about 1 week (around 12/6/2022) for VT only.           Attending Physician Attestation:  Complete documentation of historical and exam elements from today's encounter can be found in the full encounter summary report (not reduplicated in this progress note).  I personally obtained the chief complaint(s) and history of present illness.  I confirmed and edited as necessary the review of systems, past medical/surgical history, family history, social history, and examination findings as documented by others; and I examined the patient  myself.  I personally reviewed the relevant tests, images, and reports as documented above.  I formulated and edited as necessary the assessment and plan and discussed the findings and management plan with the patient and family. . - Kash Agarwal MD

## 2023-01-12 ENCOUNTER — OFFICE VISIT (OUTPATIENT)
Dept: FAMILY MEDICINE | Facility: CLINIC | Age: 48
End: 2023-01-12
Payer: COMMERCIAL

## 2023-01-12 VITALS
DIASTOLIC BLOOD PRESSURE: 82 MMHG | HEART RATE: 74 BPM | WEIGHT: 210 LBS | RESPIRATION RATE: 18 BRPM | TEMPERATURE: 97.7 F | HEIGHT: 64 IN | OXYGEN SATURATION: 97 % | BODY MASS INDEX: 35.85 KG/M2 | SYSTOLIC BLOOD PRESSURE: 158 MMHG

## 2023-01-12 DIAGNOSIS — Z12.11 SCREEN FOR COLON CANCER: ICD-10-CM

## 2023-01-12 DIAGNOSIS — R03.0 ELEVATED BP WITHOUT DIAGNOSIS OF HYPERTENSION: ICD-10-CM

## 2023-01-12 DIAGNOSIS — Z11.59 NEED FOR HEPATITIS C SCREENING TEST: ICD-10-CM

## 2023-01-12 DIAGNOSIS — Z13.220 SCREENING FOR HYPERLIPIDEMIA: ICD-10-CM

## 2023-01-12 DIAGNOSIS — Z12.4 CERVICAL CANCER SCREENING: ICD-10-CM

## 2023-01-12 DIAGNOSIS — Z79.899 MEDICATION MANAGEMENT: ICD-10-CM

## 2023-01-12 DIAGNOSIS — Z00.00 ROUTINE GENERAL MEDICAL EXAMINATION AT A HEALTH CARE FACILITY: Primary | ICD-10-CM

## 2023-01-12 PROCEDURE — 90746 HEPB VACCINE 3 DOSE ADULT IM: CPT | Performed by: FAMILY MEDICINE

## 2023-01-12 PROCEDURE — 99396 PREV VISIT EST AGE 40-64: CPT | Mod: 25 | Performed by: FAMILY MEDICINE

## 2023-01-12 PROCEDURE — 90715 TDAP VACCINE 7 YRS/> IM: CPT | Performed by: FAMILY MEDICINE

## 2023-01-12 PROCEDURE — 90472 IMMUNIZATION ADMIN EACH ADD: CPT | Performed by: FAMILY MEDICINE

## 2023-01-12 PROCEDURE — 90471 IMMUNIZATION ADMIN: CPT | Performed by: FAMILY MEDICINE

## 2023-01-12 PROCEDURE — G0145 SCR C/V CYTO,THINLAYER,RESCR: HCPCS | Performed by: FAMILY MEDICINE

## 2023-01-12 PROCEDURE — 80061 LIPID PANEL: CPT | Performed by: FAMILY MEDICINE

## 2023-01-12 PROCEDURE — 90686 IIV4 VACC NO PRSV 0.5 ML IM: CPT | Performed by: FAMILY MEDICINE

## 2023-01-12 PROCEDURE — 36415 COLL VENOUS BLD VENIPUNCTURE: CPT | Performed by: FAMILY MEDICINE

## 2023-01-12 PROCEDURE — 80053 COMPREHEN METABOLIC PANEL: CPT | Performed by: FAMILY MEDICINE

## 2023-01-12 PROCEDURE — 86803 HEPATITIS C AB TEST: CPT | Performed by: FAMILY MEDICINE

## 2023-01-12 PROCEDURE — 87624 HPV HI-RISK TYP POOLED RSLT: CPT | Performed by: FAMILY MEDICINE

## 2023-01-12 ASSESSMENT — ENCOUNTER SYMPTOMS
DIZZINESS: 0
HEMATOCHEZIA: 0
SHORTNESS OF BREATH: 0
WEAKNESS: 1
MYALGIAS: 1
ARTHRALGIAS: 1
ABDOMINAL PAIN: 0
COUGH: 0
SORE THROAT: 0
NAUSEA: 1
FEVER: 0
CONSTIPATION: 0
CHILLS: 0
JOINT SWELLING: 0
FREQUENCY: 0
PALPITATIONS: 0
DYSURIA: 0
HEMATURIA: 0
EYE PAIN: 0
HEADACHES: 1
DIARRHEA: 0
HEARTBURN: 0
PARESTHESIAS: 0
NERVOUS/ANXIOUS: 0
BREAST MASS: 0

## 2023-01-12 ASSESSMENT — PATIENT HEALTH QUESTIONNAIRE - PHQ9
10. IF YOU CHECKED OFF ANY PROBLEMS, HOW DIFFICULT HAVE THESE PROBLEMS MADE IT FOR YOU TO DO YOUR WORK, TAKE CARE OF THINGS AT HOME, OR GET ALONG WITH OTHER PEOPLE: SOMEWHAT DIFFICULT
SUM OF ALL RESPONSES TO PHQ QUESTIONS 1-9: 9
SUM OF ALL RESPONSES TO PHQ QUESTIONS 1-9: 9
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
## 2023-01-12 NOTE — PATIENT INSTRUCTIONS
? Recurrent shingles      Preventive Health Recommendations  Female Ages 40 to 49    Yearly exam:   See your health care provider every year in order to  Review health changes.   Discuss preventive care.    Review your medicines if your doctor prescribed any.    Get a Pap test every three years (unless you have an abnormal result and your provider advises testing more often).    If you get Pap tests with HPV test, you only need to test every 5 years, unless you have an abnormal result. You do not need a Pap test if your uterus was removed (hysterectomy) and you have not had cancer.    You should be tested each year for STDs (sexually transmitted diseases), if you're at risk.   Ask your doctor if you should have a mammogram.    Have a colonoscopy (test for colon cancer) if someone in your family has had colon cancer or polyps before age 50.     Have a cholesterol test every 5 years.     Have a diabetes test (fasting glucose) after age 45. If you are at risk for diabetes, you should have this test every 3 years.    Shots: Get a flu shot each year. Get a tetanus shot every 10 years.     Nutrition:   Eat at least 5 servings of fruits and vegetables each day.  Eat whole-grain bread, whole-wheat pasta and brown rice instead of white grains and rice.  Get adequate Calcium and Vitamin D.      Lifestyle  Exercise at least 150 minutes a week (an average of 30 minutes a day, 5 days a week). This will help you control your weight and prevent disease.  Limit alcohol to one drink per day.  No smoking.   Wear sunscreen to prevent skin cancer.  See your dentist every six months for an exam and cleaning.

## 2023-01-13 LAB
ALBUMIN SERPL BCG-MCNC: 4.5 G/DL (ref 3.5–5.2)
ALP SERPL-CCNC: 82 U/L (ref 35–104)
ALT SERPL W P-5'-P-CCNC: 29 U/L (ref 10–35)
ANION GAP SERPL CALCULATED.3IONS-SCNC: 14 MMOL/L (ref 7–15)
AST SERPL W P-5'-P-CCNC: 29 U/L (ref 10–35)
BILIRUB SERPL-MCNC: 0.2 MG/DL
BUN SERPL-MCNC: 9.9 MG/DL (ref 6–20)
CALCIUM SERPL-MCNC: 9.8 MG/DL (ref 8.6–10)
CHLORIDE SERPL-SCNC: 105 MMOL/L (ref 98–107)
CHOLEST SERPL-MCNC: 264 MG/DL
CREAT SERPL-MCNC: 0.64 MG/DL (ref 0.51–0.95)
DEPRECATED HCO3 PLAS-SCNC: 22 MMOL/L (ref 22–29)
GFR SERPL CREATININE-BSD FRML MDRD: >90 ML/MIN/1.73M2
GLUCOSE SERPL-MCNC: 96 MG/DL (ref 70–99)
HCV AB SERPL QL IA: NONREACTIVE
HDLC SERPL-MCNC: 40 MG/DL
LDLC SERPL CALC-MCNC: 190 MG/DL
NONHDLC SERPL-MCNC: 224 MG/DL
POTASSIUM SERPL-SCNC: 4.1 MMOL/L (ref 3.4–5.3)
PROT SERPL-MCNC: 7.5 G/DL (ref 6.4–8.3)
SODIUM SERPL-SCNC: 141 MMOL/L (ref 136–145)
TRIGL SERPL-MCNC: 172 MG/DL

## 2023-01-17 LAB
BKR LAB AP GYN ADEQUACY: NORMAL
BKR LAB AP GYN INTERPRETATION: NORMAL
BKR LAB AP HPV REFLEX: NORMAL
BKR LAB AP PREVIOUS ABNORMAL: NORMAL
PATH REPORT.COMMENTS IMP SPEC: NORMAL
PATH REPORT.COMMENTS IMP SPEC: NORMAL
PATH REPORT.RELEVANT HX SPEC: NORMAL

## 2023-01-18 ENCOUNTER — ANCILLARY PROCEDURE (OUTPATIENT)
Dept: MAMMOGRAPHY | Facility: CLINIC | Age: 48
End: 2023-01-18
Attending: FAMILY MEDICINE
Payer: COMMERCIAL

## 2023-01-18 DIAGNOSIS — Z12.31 VISIT FOR SCREENING MAMMOGRAM: ICD-10-CM

## 2023-01-18 PROCEDURE — 77063 BREAST TOMOSYNTHESIS BI: CPT | Mod: TC | Performed by: RADIOLOGY

## 2023-01-18 PROCEDURE — 77067 SCR MAMMO BI INCL CAD: CPT | Mod: TC | Performed by: RADIOLOGY

## 2023-01-19 LAB
HUMAN PAPILLOMA VIRUS 16 DNA: NEGATIVE
HUMAN PAPILLOMA VIRUS 18 DNA: NEGATIVE
HUMAN PAPILLOMA VIRUS FINAL DIAGNOSIS: NORMAL
HUMAN PAPILLOMA VIRUS OTHER HR: NEGATIVE

## 2023-04-25 ENCOUNTER — OFFICE VISIT (OUTPATIENT)
Dept: FAMILY MEDICINE | Facility: CLINIC | Age: 48
End: 2023-04-25
Payer: COMMERCIAL

## 2023-04-25 VITALS
RESPIRATION RATE: 12 BRPM | OXYGEN SATURATION: 100 % | TEMPERATURE: 97.5 F | DIASTOLIC BLOOD PRESSURE: 75 MMHG | SYSTOLIC BLOOD PRESSURE: 145 MMHG | HEART RATE: 68 BPM | WEIGHT: 200 LBS | BODY MASS INDEX: 34.33 KG/M2

## 2023-04-25 DIAGNOSIS — B02.29 POST HERPETIC NEURALGIA: Primary | ICD-10-CM

## 2023-04-25 DIAGNOSIS — R03.0 ELEVATED BP WITHOUT DIAGNOSIS OF HYPERTENSION: ICD-10-CM

## 2023-04-25 DIAGNOSIS — E78.5 HYPERLIPIDEMIA LDL GOAL <130: ICD-10-CM

## 2023-04-25 DIAGNOSIS — Z12.11 SCREEN FOR COLON CANCER: ICD-10-CM

## 2023-04-25 DIAGNOSIS — G43.109 MIGRAINE WITH AURA AND WITHOUT STATUS MIGRAINOSUS, NOT INTRACTABLE: ICD-10-CM

## 2023-04-25 PROCEDURE — 99214 OFFICE O/P EST MOD 30 MIN: CPT | Performed by: FAMILY MEDICINE

## 2023-04-25 RX ORDER — SUMATRIPTAN 50 MG/1
50 TABLET, FILM COATED ORAL
Qty: 18 TABLET | Refills: 3 | Status: SHIPPED | OUTPATIENT
Start: 2023-04-25 | End: 2024-04-26

## 2023-04-25 RX ORDER — GABAPENTIN 300 MG/1
CAPSULE ORAL
Qty: 270 CAPSULE | Refills: 3 | Status: SHIPPED | OUTPATIENT
Start: 2023-04-25 | End: 2023-12-07

## 2023-04-25 ASSESSMENT — ANXIETY QUESTIONNAIRES
8. IF YOU CHECKED OFF ANY PROBLEMS, HOW DIFFICULT HAVE THESE MADE IT FOR YOU TO DO YOUR WORK, TAKE CARE OF THINGS AT HOME, OR GET ALONG WITH OTHER PEOPLE?: SOMEWHAT DIFFICULT
7. FEELING AFRAID AS IF SOMETHING AWFUL MIGHT HAPPEN: SEVERAL DAYS
IF YOU CHECKED OFF ANY PROBLEMS ON THIS QUESTIONNAIRE, HOW DIFFICULT HAVE THESE PROBLEMS MADE IT FOR YOU TO DO YOUR WORK, TAKE CARE OF THINGS AT HOME, OR GET ALONG WITH OTHER PEOPLE: SOMEWHAT DIFFICULT
GAD7 TOTAL SCORE: 6
5. BEING SO RESTLESS THAT IT IS HARD TO SIT STILL: NOT AT ALL
1. FEELING NERVOUS, ANXIOUS, OR ON EDGE: SEVERAL DAYS
2. NOT BEING ABLE TO STOP OR CONTROL WORRYING: SEVERAL DAYS
4. TROUBLE RELAXING: SEVERAL DAYS
GAD7 TOTAL SCORE: 6
GAD7 TOTAL SCORE: 6
3. WORRYING TOO MUCH ABOUT DIFFERENT THINGS: SEVERAL DAYS
6. BECOMING EASILY ANNOYED OR IRRITABLE: SEVERAL DAYS
7. FEELING AFRAID AS IF SOMETHING AWFUL MIGHT HAPPEN: SEVERAL DAYS

## 2023-04-25 ASSESSMENT — PATIENT HEALTH QUESTIONNAIRE - PHQ9
SUM OF ALL RESPONSES TO PHQ QUESTIONS 1-9: 7
SUM OF ALL RESPONSES TO PHQ QUESTIONS 1-9: 7
10. IF YOU CHECKED OFF ANY PROBLEMS, HOW DIFFICULT HAVE THESE PROBLEMS MADE IT FOR YOU TO DO YOUR WORK, TAKE CARE OF THINGS AT HOME, OR GET ALONG WITH OTHER PEOPLE: SOMEWHAT DIFFICULT

## 2023-04-25 ASSESSMENT — PAIN SCALES - GENERAL: PAINLEVEL: SEVERE PAIN (6)

## 2023-04-25 NOTE — PROGRESS NOTES
ASSESSMENT AND PLAN  1. Post herpetic neuralgia  Trial gabapentin for this (left scalp pain/sensitivity) and migraine  prevention.   - gabapentin (NEURONTIN) 300 MG capsule; Start one tablet  Nightly and increase by one pill every week up to three times daily as tolerated.  Dispense: 270 capsule; Refill: 3    2. Migraine with aura and without status migrainosus, not intractable  With photophobia, sometimes nausea, scotoma.      Trial sumatriptan for abortive therapy as well.       - gabapentin (NEURONTIN) 300 MG capsule; Start one tablet  Nightly and increase by one pill every week up to three times daily as tolerated.  Dispense: 270 capsule; Refill: 3  - SUMAtriptan (IMITREX) 50 MG tablet; Take 1 tablet (50 mg) by mouth at onset of headache for migraine May repeat in 2 hours. Max 4 tablets/24 hours.  Dispense: 18 tablet; Refill: 3    3. Hyperlipidemia LDL goal <130  Re-check future fasting lab.   - Lipid panel reflex to direct LDL Fasting; Future    4. Screen for colon cancer  Plans to do colonoscopy.     Follow up six months.     5. Elevated blood pressue/htn: likely driven by pain and stress.  Recommend re-evaluate in 1-2 months after above items addressed.     35 minutes spent on the date of the encounter doing chart review, history and exam, negotiating and explaining the plan with the patient, documentation and further activities as noted above.             Tania Bennett is a 47 year old, presenting for the following health issues:  Hypertension and Lipids        4/25/2023    11:15 AM   Additional Questions   Roomed by Brodie CHAVEZ         History of Present Illness       Hyperlipidemia:  She presents for follow up of hyperlipidemia.  She is not taking medication to lower cholesterol. She is not having myalgia or other side effects to statin medications.    Hypertension: She presents for follow up of hypertension.  She does check blood pressure  regularly outside of the clinic. Outside blood pressures have  been over 140/90. She does not follow a low salt diet.     She eats 4 or more servings of fruits and vegetables daily.She consumes 0 sweetened beverage(s) daily.She exercises with enough effort to increase her heart rate 9 or less minutes per day.  She exercises with enough effort to increase her heart rate 3 or less days per week.   She is taking medications regularly.    Today's PHQ-9         PHQ-9 Total Score: 7    PHQ-9 Q9 Thoughts of better off dead/self-harm past 2 weeks :   Not at all    How difficult have these problems made it for you to do your work, take care of things at home, or get along with other people: Somewhat difficult  Today's SHERYL-7 Score: 6       Although stating suprisingly feels does not have depression multiple stressors including own health, family health, anniversary of friend's death, conflict with inlaws.     In addition to htn concerned about pain left scalp after shingles.  Very sensitive to touch, gives headaches at times.      Also has what she feels is h/o migraine headache, harder to control.  Photophobia, nausea, scotomoa.  traditionaly has used tylenol/ibuprofen but not working as well recently.  Having several a month.           Review of Systems         Objective    BP (!) 145/75   Pulse 68   Temp 97.5  F (36.4  C) (Temporal)   Resp 12   Wt 90.7 kg (200 lb)   SpO2 100%   BMI 34.33 kg/m    Body mass index is 34.33 kg/m .  Physical Exam   Appears well, tearful at times discussing stressors.

## 2023-04-25 NOTE — PATIENT INSTRUCTIONS
"ASSESSMENT AND PLAN  1. Post herpetic neuralgia  Trial gabapentin for this (left scalp pain/sensitivity) and migraine  prevention.   - gabapentin (NEURONTIN) 300 MG capsule; Start one tablet  Nightly and increase by one pill every week up to three times daily as tolerated.  Dispense: 270 capsule; Refill: 3    2. Migraine with aura and without status migrainosus, not intractable  With photophobia, sometimes nausea, scotoma.      Trial sumatriptan for abortive therapy as well.       - gabapentin (NEURONTIN) 300 MG capsule; Start one tablet  Nightly and increase by one pill every week up to three times daily as tolerated.  Dispense: 270 capsule; Refill: 3  - SUMAtriptan (IMITREX) 50 MG tablet; Take 1 tablet (50 mg) by mouth at onset of headache for migraine May repeat in 2 hours. Max 4 tablets/24 hours.  Dispense: 18 tablet; Refill: 3    3. Hyperlipidemia LDL goal <130  Re-check future fasting lab.   - Lipid panel reflex to direct LDL Fasting; Future    4. Screen for colon cancer  Plans to do colonoscopy.     Follow up six months.           [employer] SIGN UP: [email or web address] , [phone]    E-VISITS CAN BE DONE FOR CARE/PRESCRIPTIONS WHICH MAY NOT NEED AN IN-PERSON ASSESSMENT - click \"on-line care, then request e-visit\".      ONCARE VISIT/PRESCRIPTIONS: [email or web address]  - we treat nearly 50 common conditions with one hour response time     RADIOLOGY SCHEDULING  [facility]:  [phone]   [facility]: [phone]  [facility]: [phone]    Mammogram and Colonoscopy [phone]    Smoking Cessation: [email or web address], [phone] ([phone])    [facility] Scheduling:  [phone].    CONSUMER PRICE LINE for estimates of test costs:  [phone]       "

## 2023-04-27 ENCOUNTER — TELEPHONE (OUTPATIENT)
Dept: GASTROENTEROLOGY | Facility: CLINIC | Age: 48
End: 2023-04-27
Payer: COMMERCIAL

## 2023-04-27 NOTE — TELEPHONE ENCOUNTER
Screening Questions  BLUE  KIND OF PREP RED  LOCATION [review exclusion criteria] GREEN  SEDATION TYPE  Y Are you active on mychart?      WEGENER,JOEL  Ordering/Referring Provider?    [employer] What type of coverage do you have?      N Have you had a positive covid test in the last 14 days?     34.33 1. BMI  [BMI 40+ - review exclusion criteria& smart-phrase document]    Y  2. Are you able to give consent for your medical care? [IF NO,RN REVIEW]          N  3. Are you taking any prescription pain medications on a routine schedule   (ex narcotics: oxycodone, roxicodone, oxycontin,  and percocet)? [RN Review]        N  3a. EXTENDED PREP What kind of prescription?     N 4. Do you have any chemical dependencies such as alcohol, street drugs, or methadone?        **If yes 3- 5 , please schedule with MAC sedation.**          IF YES TO ANY 6 - 10 - HOSPITAL SETTING ONLY.     N 6.   Do you need assistance transferring?     N 7.   Have you had a heart or lung transplant?    N 8.   Are you currently on dialysis?   N 9.   Do you use daily home oxygen?   N 10. Do you take nitroglycerin?   10a. N If yes, how often?     11. [FEMALES]  N Are you currently pregnant?    11a. N If yes, how many weeks? [ Greater than 12 weeks, OR NEEDED]    N 12. Do you have Pulmonary Hypertension? *NEED PAC APPT AT [facility] w/ [facility]*     N 13. [review exclusion criteria]  Do you have any implantable devices in your body (pacemaker, defib, LVAD)?    N 14. In the past 6 months, have you had any heart related issues including cardiomyopathy or heart attack?     14a. N If yes, did it require cardiac stenting if so when?     N 15. Have you had a stroke or Transient ischemic attack (TIA - aka  mini stroke ) within 6 months?      N 16. Do you have mod to severe Obstructive Sleep Apnea?  [Hospital only]    N 17. Do you have SEVERE AND UNCONTROLLED asthma? *NEED PAC APPT AT [facility] w/MAC*     18. Are you currently taking any blood thinners?     18a. No. Continue to  "19.   18b. Yes/no Blood Thinner: No [CONTINUE TO #19]    N 19. Do you take the medication Phentermine?    19a. If yes, \"Hold for 7 days before procedure.  Please consult your prescribing provider if you have questions about holding this medication.\"     N  20. Do you have chronic kidney disease?      N  21. Do you have a diagnosis of diabetes?     N  22. On a regular basis do you go 3-5 days between bowel movements?     N 23. Preferred LOCAL Pharmacy for Pre Prescription    [ LIST ONLY ONE PHARMACY]     [id number] - [facility] [address] AT [facility] & [address] [place] [address]        - CLOSING REMINDERS -    Informed patient they will need an adult    Cannot take any type of public or medical transportation alone    Conscious Sedation- Needs  for 6 hours after the procedure       MAC/General-Needs  for 24 hours after procedure    Pre-Procedure Covid test to be completed [[facility] PCR Testing Required]    Confirmed Nurse will call to complete assessment       - SCHEDULING DETAILS -  N Hospital Setting Required? If yes, what is the exclusion?: N   JAVED  Surgeon    7/11  Date of Procedure  Lower Endoscopy [Colonoscopy]  Type of Procedure Scheduled  [facility]   MIRALAX GATORADE WITHOUT MAGNEISUM CITRATE Which Colonoscopy Prep was Sent?     MAC Sedation Type      PAC / Pre-op Required                 "

## 2023-05-05 ENCOUNTER — LAB (OUTPATIENT)
Dept: LAB | Facility: CLINIC | Age: 48
End: 2023-05-05
Payer: COMMERCIAL

## 2023-05-05 DIAGNOSIS — E78.5 HYPERLIPIDEMIA LDL GOAL <130: ICD-10-CM

## 2023-05-05 LAB
CHOLEST SERPL-MCNC: 235 MG/DL
HDLC SERPL-MCNC: 43 MG/DL
LDLC SERPL CALC-MCNC: 164 MG/DL
NONHDLC SERPL-MCNC: 192 MG/DL
TRIGL SERPL-MCNC: 140 MG/DL

## 2023-05-05 PROCEDURE — 80061 LIPID PANEL: CPT

## 2023-05-05 PROCEDURE — 36415 COLL VENOUS BLD VENIPUNCTURE: CPT

## 2023-06-27 ENCOUNTER — TELEPHONE (OUTPATIENT)
Dept: GASTROENTEROLOGY | Facility: CLINIC | Age: 48
End: 2023-06-27

## 2023-06-27 NOTE — TELEPHONE ENCOUNTER
Pre assessment completed for upcoming procedure.  Procedure details:    Patient scheduled for Colonoscopy  on 7/11/23.     Arrival time: 1200. Procedure time 1300    Pre op exam needed? N/A    Facility location: [facility]; [address] [place] [address], [address] Floor, [place] [address]    Sedation type: MAC    Indication for procedure: screening colonoscopy    COVID policy reviewed.    Designated  policy reviewed. Instructed to have someone stay 24 hours post procedure.       Chart review:     Electronic implanted devices? No    Diabetic? No      Medication review:    Anticoagulants? No    NSAIDS? Yes.  Ibuprofen (Advil, Motrin).  Holding interval of 1 day before procedure.    Other medication HOLDING recommendations:  N/A      Prep for procedure:     Bowel prep recommendation: Miralax prep without magnesium citrate    Prep instructions sent via [employer]     Reviewed procedure prep instructions.     Patient verbalized understanding and had no questions or concerns at this time.        Andree Granger RN  Endoscopy Procedure Pre Assessment RN  [phone] option 4

## 2023-07-10 ENCOUNTER — ANESTHESIA EVENT (OUTPATIENT)
Dept: SURGERY | Facility: AMBULATORY SURGERY CENTER | Age: 48
End: 2023-07-10
Payer: COMMERCIAL

## 2023-07-11 ENCOUNTER — ANESTHESIA (OUTPATIENT)
Dept: SURGERY | Facility: AMBULATORY SURGERY CENTER | Age: 48
End: 2023-07-11
Payer: COMMERCIAL

## 2023-07-11 ENCOUNTER — HOSPITAL ENCOUNTER (OUTPATIENT)
Facility: AMBULATORY SURGERY CENTER | Age: 48
Discharge: HOME | End: 2023-07-11
Attending: INTERNAL MEDICINE
Payer: COMMERCIAL

## 2023-07-11 VITALS
TEMPERATURE: 97.6 F | RESPIRATION RATE: 14 BRPM | OXYGEN SATURATION: 100 % | HEART RATE: 58 BPM | DIASTOLIC BLOOD PRESSURE: 89 MMHG | SYSTOLIC BLOOD PRESSURE: 157 MMHG

## 2023-07-11 VITALS — HEART RATE: 70 BPM

## 2023-07-11 LAB — COLONOSCOPY: NORMAL

## 2023-07-11 PROCEDURE — 45380 COLONOSCOPY AND BIOPSY: CPT | Mod: 33 | Performed by: INTERNAL MEDICINE

## 2023-07-11 PROCEDURE — 88305 TISSUE EXAM BY PATHOLOGIST: CPT | Mod: TC | Performed by: INTERNAL MEDICINE

## 2023-07-11 PROCEDURE — 88305 TISSUE EXAM BY PATHOLOGIST: CPT | Mod: 26 | Performed by: PATHOLOGY

## 2023-07-11 RX ORDER — PROPOFOL 10 MG/ML
INJECTION, EMULSION INTRAVENOUS CONTINUOUS PRN
Status: DISCONTINUED | OUTPATIENT
Start: 2023-07-11 | End: 2023-07-11

## 2023-07-11 RX ORDER — NALOXONE HYDROCHLORIDE 0.4 MG/ML
0.4 INJECTION, SOLUTION INTRAMUSCULAR; INTRAVENOUS; SUBCUTANEOUS
Status: DISCONTINUED | OUTPATIENT
Start: 2023-07-11 | End: 2023-07-12 | Stop reason: HOSPADM

## 2023-07-11 RX ORDER — FLUMAZENIL 0.1 MG/ML
0.2 INJECTION, SOLUTION INTRAVENOUS
Status: DISCONTINUED | OUTPATIENT
Start: 2023-07-11 | End: 2023-07-12 | Stop reason: HOSPADM

## 2023-07-11 RX ORDER — PROCHLORPERAZINE MALEATE 10 MG
10 TABLET ORAL EVERY 6 HOURS PRN
Status: DISCONTINUED | OUTPATIENT
Start: 2023-07-11 | End: 2023-07-12 | Stop reason: HOSPADM

## 2023-07-11 RX ORDER — LIDOCAINE 40 MG/G
CREAM TOPICAL
Status: DISCONTINUED | OUTPATIENT
Start: 2023-07-11 | End: 2023-07-11 | Stop reason: HOSPADM

## 2023-07-11 RX ORDER — ONDANSETRON 4 MG/1
4 TABLET, ORALLY DISINTEGRATING ORAL EVERY 6 HOURS PRN
Status: DISCONTINUED | OUTPATIENT
Start: 2023-07-11 | End: 2023-07-12 | Stop reason: HOSPADM

## 2023-07-11 RX ORDER — PROPOFOL 10 MG/ML
INJECTION, EMULSION INTRAVENOUS PRN
Status: DISCONTINUED | OUTPATIENT
Start: 2023-07-11 | End: 2023-07-11

## 2023-07-11 RX ORDER — LIDOCAINE HYDROCHLORIDE 20 MG/ML
INJECTION, SOLUTION INFILTRATION; PERINEURAL PRN
Status: DISCONTINUED | OUTPATIENT
Start: 2023-07-11 | End: 2023-07-11

## 2023-07-11 RX ORDER — ONDANSETRON 2 MG/ML
4 INJECTION INTRAMUSCULAR; INTRAVENOUS
Status: DISCONTINUED | OUTPATIENT
Start: 2023-07-11 | End: 2023-07-11 | Stop reason: HOSPADM

## 2023-07-11 RX ORDER — NALOXONE HYDROCHLORIDE 0.4 MG/ML
0.2 INJECTION, SOLUTION INTRAMUSCULAR; INTRAVENOUS; SUBCUTANEOUS
Status: DISCONTINUED | OUTPATIENT
Start: 2023-07-11 | End: 2023-07-12 | Stop reason: HOSPADM

## 2023-07-11 RX ORDER — SODIUM CHLORIDE, SODIUM LACTATE, POTASSIUM CHLORIDE, CALCIUM CHLORIDE 600; 310; 30; 20 MG/100ML; MG/100ML; MG/100ML; MG/100ML
INJECTION, SOLUTION INTRAVENOUS CONTINUOUS PRN
Status: DISCONTINUED | OUTPATIENT
Start: 2023-07-11 | End: 2023-07-11

## 2023-07-11 RX ORDER — ONDANSETRON 2 MG/ML
4 INJECTION INTRAMUSCULAR; INTRAVENOUS EVERY 6 HOURS PRN
Status: DISCONTINUED | OUTPATIENT
Start: 2023-07-11 | End: 2023-07-12 | Stop reason: HOSPADM

## 2023-07-11 RX ADMIN — PROPOFOL 200 MCG/KG/MIN: 10 INJECTION, EMULSION INTRAVENOUS at 13:11

## 2023-07-11 RX ADMIN — LIDOCAINE HYDROCHLORIDE 60 MG: 20 INJECTION, SOLUTION INFILTRATION; PERINEURAL at 13:11

## 2023-07-11 RX ADMIN — SODIUM CHLORIDE, SODIUM LACTATE, POTASSIUM CHLORIDE, CALCIUM CHLORIDE: 600; 310; 30; 20 INJECTION, SOLUTION INTRAVENOUS at 13:08

## 2023-07-11 RX ADMIN — PROPOFOL 60 MG: 10 INJECTION, EMULSION INTRAVENOUS at 13:11

## 2023-07-11 RX ADMIN — PROPOFOL 40 MG: 10 INJECTION, EMULSION INTRAVENOUS at 13:33

## 2023-07-11 NOTE — ANESTHESIA CARE TRANSFER NOTE
Patient: Sabrina Mccoy    Procedure: Procedure(s):  COLONOSCOPY, WITH POLYPECTOMY       Diagnosis: Screen for colon cancer [Z12.11]  Diagnosis Additional Information: No value filed.    Anesthesia Type:   MAC     Note:    Oropharynx: oropharynx clear of all foreign objects and spontaneously breathing  Level of Consciousness: awake  Oxygen Supplementation: room air    Independent Airway: airway patency satisfactory and stable  Dentition: dentition unchanged  Vital Signs Stable: post-procedure vital signs reviewed and stable  Report to RN Given: handoff report given  Patient transferred to: Phase II    Handoff Report: Identifed the Patient, Identified the Reponsible Provider, Reviewed the pertinent medical history, Discussed the surgical course, Reviewed Intra-OP anesthesia mangement and issues during anesthesia, Set expectations for post-procedure period and Allowed opportunity for questions and acknowledgement of understanding      Vitals:  Vitals Value Taken Time   BP     Temp     Pulse     Resp     SpO2         Electronically Signed By: CARLENE Fung CRNA  July 11, 2023  1:47 PM

## 2023-07-11 NOTE — ANESTHESIA POSTPROCEDURE EVALUATION
Patient: Sabrina Mccoy    Procedure: Procedure(s):  COLONOSCOPY, WITH POLYPECTOMY       Anesthesia Type:  MAC    Note:  Disposition: Outpatient   Postop Pain Control: Uneventful            Sign Out: Well controlled pain   PONV: No   Neuro/Psych: Uneventful            Sign Out: Acceptable/Baseline neuro status   Airway/Respiratory: Uneventful            Sign Out: Acceptable/Baseline resp. status   CV/Hemodynamics: Uneventful            Sign Out: Acceptable CV status; No obvious hypovolemia; No obvious fluid overload   Other NRE:    DID A NON-ROUTINE EVENT OCCUR? No           Last vitals:  Vitals Value Taken Time   /89 07/11/23 1413   Temp 36.4  C (97.6  F) 07/11/23 1413   Pulse     Resp 14 07/11/23 1413   SpO2 100 % 07/11/23 1413       Electronically Signed By: Sal Roth MD  July 11, 2023  3:15 PM
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
## 2023-07-12 LAB
PATH REPORT.COMMENTS IMP SPEC: NORMAL
PATH REPORT.COMMENTS IMP SPEC: NORMAL
PATH REPORT.FINAL DX SPEC: NORMAL
PATH REPORT.GROSS SPEC: NORMAL
PATH REPORT.MICROSCOPIC SPEC OTHER STN: NORMAL
PATH REPORT.RELEVANT HX SPEC: NORMAL
PHOTO IMAGE: NORMAL

## 2023-11-21 ENCOUNTER — MYC MEDICAL ADVICE (OUTPATIENT)
Dept: FAMILY MEDICINE | Facility: CLINIC | Age: 48
End: 2023-11-21
Payer: COMMERCIAL

## 2023-11-22 NOTE — TELEPHONE ENCOUNTER
JW,      Please see [employer] message.  Patient  informed you are out of the office until Friday 11/24.      Thank you,  Sharyn Rao RN

## 2023-11-29 NOTE — TELEPHONE ENCOUNTER
PRECIOUS,   Please see mychart response   Will schedule patient in the next week or two  Please advise if other recommendations in the mean time due to fatigue symptoms  Thanks,  Graciela HUERTA RN       no urine output since 5 hr ago.

## 2023-11-30 NOTE — TELEPHONE ENCOUNTER
Tc please assist with scheduling see below. I would suggest scheduling dec 7th and then check with her to see if she can make it.  Joel Wegener,MD

## 2023-12-07 ENCOUNTER — OFFICE VISIT (OUTPATIENT)
Dept: FAMILY MEDICINE | Facility: CLINIC | Age: 48
End: 2023-12-07
Payer: COMMERCIAL

## 2023-12-07 VITALS
RESPIRATION RATE: 12 BRPM | HEART RATE: 60 BPM | OXYGEN SATURATION: 96 % | TEMPERATURE: 97.7 F | WEIGHT: 200.4 LBS | HEIGHT: 64 IN | DIASTOLIC BLOOD PRESSURE: 83 MMHG | SYSTOLIC BLOOD PRESSURE: 135 MMHG | BODY MASS INDEX: 34.21 KG/M2

## 2023-12-07 DIAGNOSIS — B02.29 POST HERPETIC NEURALGIA: ICD-10-CM

## 2023-12-07 DIAGNOSIS — G43.109 MIGRAINE WITH AURA AND WITHOUT STATUS MIGRAINOSUS, NOT INTRACTABLE: ICD-10-CM

## 2023-12-07 DIAGNOSIS — Z13.1 SCREENING FOR DIABETES MELLITUS: Primary | ICD-10-CM

## 2023-12-07 LAB
ERYTHROCYTE [DISTWIDTH] IN BLOOD BY AUTOMATED COUNT: 11.9 % (ref 10–15)
FOLATE SERPL-MCNC: 17.7 NG/ML (ref 4.6–34.8)
HBA1C MFR BLD: 5.1 % (ref 0–5.6)
HCT VFR BLD AUTO: 43 % (ref 35–47)
HGB BLD-MCNC: 14.4 G/DL (ref 11.7–15.7)
MCH RBC QN AUTO: 30.8 PG (ref 26.5–33)
MCHC RBC AUTO-ENTMCNC: 33.5 G/DL (ref 31.5–36.5)
MCV RBC AUTO: 92 FL (ref 78–100)
PLATELET # BLD AUTO: 270 10E3/UL (ref 150–450)
RBC # BLD AUTO: 4.68 10E6/UL (ref 3.8–5.2)
TSH SERPL DL<=0.005 MIU/L-ACNC: 1.71 UIU/ML (ref 0.3–4.2)
VIT B12 SERPL-MCNC: 539 PG/ML (ref 232–1245)
WBC # BLD AUTO: 8.3 10E3/UL (ref 4–11)

## 2023-12-07 PROCEDURE — 90480 ADMN SARSCOV2 VAC 1/ONLY CMP: CPT | Performed by: FAMILY MEDICINE

## 2023-12-07 PROCEDURE — 85027 COMPLETE CBC AUTOMATED: CPT | Performed by: FAMILY MEDICINE

## 2023-12-07 PROCEDURE — 36415 COLL VENOUS BLD VENIPUNCTURE: CPT | Performed by: FAMILY MEDICINE

## 2023-12-07 PROCEDURE — 84443 ASSAY THYROID STIM HORMONE: CPT | Performed by: FAMILY MEDICINE

## 2023-12-07 PROCEDURE — 83036 HEMOGLOBIN GLYCOSYLATED A1C: CPT | Performed by: FAMILY MEDICINE

## 2023-12-07 PROCEDURE — 99214 OFFICE O/P EST MOD 30 MIN: CPT | Performed by: FAMILY MEDICINE

## 2023-12-07 PROCEDURE — 82607 VITAMIN B-12: CPT | Performed by: FAMILY MEDICINE

## 2023-12-07 PROCEDURE — 82746 ASSAY OF FOLIC ACID SERUM: CPT | Performed by: FAMILY MEDICINE

## 2023-12-07 PROCEDURE — 91320 SARSCV2 VAC 30MCG TRS-SUC IM: CPT | Performed by: FAMILY MEDICINE

## 2023-12-07 RX ORDER — GABAPENTIN 300 MG/1
CAPSULE ORAL
Qty: 270 CAPSULE | Refills: 3 | Status: SHIPPED | OUTPATIENT
Start: 2023-12-07 | End: 2024-04-26

## 2023-12-07 ASSESSMENT — PAIN SCALES - GENERAL: PAINLEVEL: SEVERE PAIN (7)

## 2023-12-07 NOTE — LETTER
December 8, 2023      Sabrina Mccoy  [address]  [facility] [address]        Dear ,    We are writing to inform you of your test results.    Your test results fall within the expected range(s) or remain unchanged from previous results.  Please continue with current treatment plan.    Resulted Orders   CBC with platelets   Result Value Ref Range    WBC Count 8.3 4.0 - 11.0 10e3/uL    RBC Count 4.68 3.80 - 5.20 10e6/uL    Hemoglobin 14.4 11.7 - 15.7 g/dL    Hematocrit 43.0 35.0 - 47.0 %    MCV 92 78 - 100 fL    MCH 30.8 26.5 - 33.0 pg    MCHC 33.5 31.5 - 36.5 g/dL    RDW 11.9 10.0 - 15.0 %    Platelet Count 270 150 - 450 10e3/uL   Hemoglobin A1c   Result Value Ref Range    Hemoglobin A1C 5.1 0.0 - 5.6 %      Comment:      Normal <5.7%   Prediabetes 5.7-6.4%    Diabetes 6.5% or higher     Note: Adopted from ADA consensus guidelines.   TSH with free T4 reflex   Result Value Ref Range    TSH 1.71 0.30 - 4.20 uIU/mL   Vitamin B12   Result Value Ref Range    Vitamin B12 539 232 - 1,245 pg/mL   Folate   Result Value Ref Range    Folic Acid 17.7 4.6 - 34.8 ng/mL       If you have any questions or concerns, please call the clinic at the number listed above.       Sincerely,      Joel Daniel Irwin Wegener, MD

## 2023-12-07 NOTE — PATIENT INSTRUCTIONS
Post herpetic neuralgia:     Confirmed diagnosis.  Discussed strategies for rearranging gabapentin dosing to help.  Could consider lyrica in future if truly too sedating.     Check labs for underlying conditions which could contribute to neuropathy today.     Covid/flu today.     Re-check bp    Follow up jan/feb for physical.

## 2023-12-07 NOTE — PROGRESS NOTES
Assessment & Plan   Post herpetic neuralgia:     Sabrina is continuing to experience neuralgias in her left scalp region in the late afternoon after her confirmed diagnosis of post herpetic neuralgia after a shingles outbreak at the end of 2022.  We discussed strategies for rearranging gabapentin dosing to help.  She is currently taking 600mg in the evening prior to bed.  She will try to move that dose up a few hours to see if that helps relieve some of the late afternoon symptoms.  Sabrina also understands that she can try 300mg midday, followed by her regular 600mg prior to bed.  If this strategy does not seem to help her symptoms or becomes too sedating, Lyrica could be trialed in the future as an alternative therapy.     Labs as noted in her chart are being ordered today to seek out potential underlying conditions which could contribute to neuropathy.     Fibromyalgia and CPS are well controlled with conservative therapy options with primary symptoms of fatigue that come on early in the evening.    Sabrina is interested in applying for early social security benefits.  She will contact [facility] to proceed and will get in touch with her healthcare team with any questions or documentation that can be helped with.    Sabrina is agreeable to receiving both the covid/flu vaccinations at today's visit.     She will schedule a follow up visit early next year to check in on her neuralgia symptoms and for an annual physical.     Post herpetic neuralgia    - gabapentin (NEURONTIN) 300 MG capsule; 2 tablets in the evening and 1 tablet at lunch as needed to control pain.  - CBC with platelets; Future  - Hemoglobin A1c; Future  - TSH with free T4 reflex; Future  - Vitamin B12; Future  - Folate; Future    Migraine with aura and without status migrainosus, not intractable    - gabapentin (NEURONTIN) 300 MG capsule; 2 tablets in the evening and 1 tablet at lunch as needed to control pain.    Screening for diabetes mellitus    -  "Hemoglobin A1c; Future      35 minutes spent by me on the date of the encounter doing chart review, history and exam, documentation and further activities per the note       BMI:   Estimated body mass index is 34.4 kg/m  as calculated from the following:    Height as of this encounter: 1.626 m (5' 4\").    Weight as of this encounter: 90.9 kg (200 lb 6.4 oz).           Joel Daniel Wegener, MD  [facility]    Tania Bennett is a 47 year old, presenting for the following health issues:  Nerve Pain        12/7/2023    12:54 PM   Additional Questions   Roomed by Brodie STANTON       History of Present Illness       Reason for visit:  Nerve pain from shingles    She eats 4 or more servings of fruits and vegetables daily.She consumes 0 sweetened beverage(s) daily.She exercises with enough effort to increase her heart rate 30 to 60 minutes per day.  She exercises with enough effort to increase her heart rate 4 days per week. She is missing 1 dose(s) of medications per week.  She is not taking prescribed medications regularly due to other.     Fatigued              Review of Systems         Objective    /83 (BP Location: Left arm, Patient Position: Sitting, Cuff Size: Adult Large)   Pulse 60   Temp 97.7  F (36.5  C) (Temporal)   Resp 12   Ht 1.626 m (5' 4\")   Wt 90.9 kg (200 lb 6.4 oz)   LMP 07/11/2018 (Exact Date)   SpO2 96%   BMI 34.40 kg/m    Body mass index is 34.4 kg/m .  Physical Exam   Appears well.                       "

## 2023-12-19 ENCOUNTER — PATIENT OUTREACH (OUTPATIENT)
Dept: CARE COORDINATION | Facility: CLINIC | Age: 48
End: 2023-12-19
Payer: COMMERCIAL

## 2024-01-16 ENCOUNTER — PATIENT OUTREACH (OUTPATIENT)
Dept: CARE COORDINATION | Facility: CLINIC | Age: 49
End: 2024-01-16
Payer: COMMERCIAL

## 2024-01-29 ENCOUNTER — ANCILLARY PROCEDURE (OUTPATIENT)
Dept: MAMMOGRAPHY | Facility: CLINIC | Age: 49
End: 2024-01-29
Attending: FAMILY MEDICINE
Payer: COMMERCIAL

## 2024-01-29 DIAGNOSIS — Z12.31 VISIT FOR SCREENING MAMMOGRAM: ICD-10-CM

## 2024-01-29 PROCEDURE — 77067 SCR MAMMO BI INCL CAD: CPT | Mod: TC | Performed by: RADIOLOGY

## 2024-01-29 PROCEDURE — 77063 BREAST TOMOSYNTHESIS BI: CPT | Mod: TC | Performed by: RADIOLOGY

## 2024-03-17 ENCOUNTER — HEALTH MAINTENANCE LETTER (OUTPATIENT)
Age: 49
End: 2024-03-17

## 2024-04-26 ENCOUNTER — ANCILLARY PROCEDURE (OUTPATIENT)
Dept: GENERAL RADIOLOGY | Facility: CLINIC | Age: 49
End: 2024-04-26
Attending: FAMILY MEDICINE
Payer: COMMERCIAL

## 2024-04-26 ENCOUNTER — OFFICE VISIT (OUTPATIENT)
Dept: FAMILY MEDICINE | Facility: CLINIC | Age: 49
End: 2024-04-26
Payer: COMMERCIAL

## 2024-04-26 VITALS
RESPIRATION RATE: 16 BRPM | HEIGHT: 65 IN | OXYGEN SATURATION: 97 % | HEART RATE: 56 BPM | BODY MASS INDEX: 34.55 KG/M2 | WEIGHT: 207.4 LBS | DIASTOLIC BLOOD PRESSURE: 82 MMHG | SYSTOLIC BLOOD PRESSURE: 152 MMHG | TEMPERATURE: 98.1 F

## 2024-04-26 DIAGNOSIS — Z00.00 WELL WOMAN EXAM (NO GYNECOLOGICAL EXAM): ICD-10-CM

## 2024-04-26 DIAGNOSIS — R03.0 ELEVATED BP WITHOUT DIAGNOSIS OF HYPERTENSION: ICD-10-CM

## 2024-04-26 DIAGNOSIS — M25.552 BILATERAL HIP PAIN: ICD-10-CM

## 2024-04-26 DIAGNOSIS — M25.551 BILATERAL HIP PAIN: ICD-10-CM

## 2024-04-26 DIAGNOSIS — G43.109 MIGRAINE WITH AURA AND WITHOUT STATUS MIGRAINOSUS, NOT INTRACTABLE: ICD-10-CM

## 2024-04-26 DIAGNOSIS — L30.1 DYSHIDROTIC ECZEMA: ICD-10-CM

## 2024-04-26 DIAGNOSIS — Z79.899 MEDICATION MANAGEMENT: ICD-10-CM

## 2024-04-26 DIAGNOSIS — Z13.6 CARDIOVASCULAR SCREENING; LDL GOAL LESS THAN 160: ICD-10-CM

## 2024-04-26 DIAGNOSIS — E66.01 MORBID OBESITY (H): ICD-10-CM

## 2024-04-26 DIAGNOSIS — R51.9 SCALP PAIN: Primary | ICD-10-CM

## 2024-04-26 DIAGNOSIS — B02.29 POST HERPETIC NEURALGIA: ICD-10-CM

## 2024-04-26 PROCEDURE — 36415 COLL VENOUS BLD VENIPUNCTURE: CPT | Performed by: FAMILY MEDICINE

## 2024-04-26 PROCEDURE — 73522 X-RAY EXAM HIPS BI 3-4 VIEWS: CPT | Mod: TC | Performed by: RADIOLOGY

## 2024-04-26 PROCEDURE — 80053 COMPREHEN METABOLIC PANEL: CPT | Performed by: FAMILY MEDICINE

## 2024-04-26 PROCEDURE — 99396 PREV VISIT EST AGE 40-64: CPT | Performed by: FAMILY MEDICINE

## 2024-04-26 PROCEDURE — 99214 OFFICE O/P EST MOD 30 MIN: CPT | Mod: 25 | Performed by: FAMILY MEDICINE

## 2024-04-26 PROCEDURE — 80061 LIPID PANEL: CPT | Performed by: FAMILY MEDICINE

## 2024-04-26 RX ORDER — SUMATRIPTAN 50 MG/1
50 TABLET, FILM COATED ORAL
Qty: 18 TABLET | Refills: 3 | Status: SHIPPED | OUTPATIENT
Start: 2024-04-26

## 2024-04-26 RX ORDER — GABAPENTIN 300 MG/1
CAPSULE ORAL
Qty: 180 CAPSULE | Refills: 3 | Status: SHIPPED | OUTPATIENT
Start: 2024-04-26

## 2024-04-26 RX ORDER — TRIAMCINOLONE ACETONIDE 1 MG/G
OINTMENT TOPICAL 2 TIMES DAILY
Qty: 30 G | Refills: 3 | Status: SHIPPED | OUTPATIENT
Start: 2024-04-26

## 2024-04-26 SDOH — HEALTH STABILITY: PHYSICAL HEALTH: ON AVERAGE, HOW MANY MINUTES DO YOU ENGAGE IN EXERCISE AT THIS LEVEL?: 40 MIN

## 2024-04-26 SDOH — HEALTH STABILITY: PHYSICAL HEALTH: ON AVERAGE, HOW MANY DAYS PER WEEK DO YOU ENGAGE IN MODERATE TO STRENUOUS EXERCISE (LIKE A BRISK WALK)?: 4 DAYS

## 2024-04-26 ASSESSMENT — PATIENT HEALTH QUESTIONNAIRE - PHQ9
SUM OF ALL RESPONSES TO PHQ QUESTIONS 1-9: 8
SUM OF ALL RESPONSES TO PHQ QUESTIONS 1-9: 8
10. IF YOU CHECKED OFF ANY PROBLEMS, HOW DIFFICULT HAVE THESE PROBLEMS MADE IT FOR YOU TO DO YOUR WORK, TAKE CARE OF THINGS AT HOME, OR GET ALONG WITH OTHER PEOPLE: SOMEWHAT DIFFICULT

## 2024-04-26 ASSESSMENT — ANXIETY QUESTIONNAIRES
GAD7 TOTAL SCORE: 5
8. IF YOU CHECKED OFF ANY PROBLEMS, HOW DIFFICULT HAVE THESE MADE IT FOR YOU TO DO YOUR WORK, TAKE CARE OF THINGS AT HOME, OR GET ALONG WITH OTHER PEOPLE?: SOMEWHAT DIFFICULT
2. NOT BEING ABLE TO STOP OR CONTROL WORRYING: NOT AT ALL
4. TROUBLE RELAXING: SEVERAL DAYS
7. FEELING AFRAID AS IF SOMETHING AWFUL MIGHT HAPPEN: SEVERAL DAYS
GAD7 TOTAL SCORE: 5
5. BEING SO RESTLESS THAT IT IS HARD TO SIT STILL: NOT AT ALL
3. WORRYING TOO MUCH ABOUT DIFFERENT THINGS: SEVERAL DAYS
IF YOU CHECKED OFF ANY PROBLEMS ON THIS QUESTIONNAIRE, HOW DIFFICULT HAVE THESE PROBLEMS MADE IT FOR YOU TO DO YOUR WORK, TAKE CARE OF THINGS AT HOME, OR GET ALONG WITH OTHER PEOPLE: SOMEWHAT DIFFICULT
7. FEELING AFRAID AS IF SOMETHING AWFUL MIGHT HAPPEN: SEVERAL DAYS
6. BECOMING EASILY ANNOYED OR IRRITABLE: SEVERAL DAYS
1. FEELING NERVOUS, ANXIOUS, OR ON EDGE: SEVERAL DAYS
GAD7 TOTAL SCORE: 5

## 2024-04-26 ASSESSMENT — PAIN SCALES - GENERAL: PAINLEVEL: EXTREME PAIN (8)

## 2024-04-26 ASSESSMENT — SOCIAL DETERMINANTS OF HEALTH (SDOH): HOW OFTEN DO YOU GET TOGETHER WITH FRIENDS OR RELATIVES?: THREE TIMES A WEEK
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
## 2024-04-26 NOTE — PATIENT INSTRUCTIONS
Sleep /hip pain/scalp pain:  much better but still would liike to use some gabapentin for sleep/pain.  Ok to re-start gabapentin 300-600mg     Left anterior knee pain: by exam likely plica irritation, recommend icing three times daily for 3-4 days.     Hip pain/bursitis: worse with palpation consistent with bursitis and worse withactivity/worse in morning. Recommend ibuprofen 600mg three times daily for 3 days and ice 15 minutes 4-5 toimes daily during those days.  Follow up as needed, consider hip injections.     Fibromyalgia: hips, back, neck, arms.  Not working now.  Unable to work due to pain.  Has not applied for social security disability yet.  Fearful will be told is faking, etc.     Elevated blood pressue:  re-check 150/82.  Recommend home resting blood pressue monitoring  follow up collaborative RN htn visit one month.  Monitor salt intake.     Overweight/obesity:  hoping if can address above to increase activity.

## 2024-04-27 LAB
ALBUMIN SERPL BCG-MCNC: 4.3 G/DL (ref 3.5–5.2)
ALP SERPL-CCNC: 84 U/L (ref 40–150)
ALT SERPL W P-5'-P-CCNC: 47 U/L (ref 0–50)
ANION GAP SERPL CALCULATED.3IONS-SCNC: 9 MMOL/L (ref 7–15)
AST SERPL W P-5'-P-CCNC: 36 U/L (ref 0–45)
BILIRUB SERPL-MCNC: 0.3 MG/DL
BUN SERPL-MCNC: 9.1 MG/DL (ref 6–20)
CALCIUM SERPL-MCNC: 9.6 MG/DL (ref 8.6–10)
CHLORIDE SERPL-SCNC: 103 MMOL/L (ref 98–107)
CHOLEST SERPL-MCNC: 242 MG/DL
CREAT SERPL-MCNC: 0.71 MG/DL (ref 0.51–0.95)
DEPRECATED HCO3 PLAS-SCNC: 27 MMOL/L (ref 22–29)
EGFRCR SERPLBLD CKD-EPI 2021: >90 ML/MIN/1.73M2
FASTING STATUS PATIENT QL REPORTED: NO
GLUCOSE SERPL-MCNC: 77 MG/DL (ref 70–99)
HDLC SERPL-MCNC: 50 MG/DL
LDLC SERPL CALC-MCNC: 170 MG/DL
NONHDLC SERPL-MCNC: 192 MG/DL
POTASSIUM SERPL-SCNC: 4 MMOL/L (ref 3.4–5.3)
PROT SERPL-MCNC: 7.3 G/DL (ref 6.4–8.3)
SODIUM SERPL-SCNC: 139 MMOL/L (ref 135–145)
TRIGL SERPL-MCNC: 108 MG/DL

## 2024-05-14 ENCOUNTER — MYC MEDICAL ADVICE (OUTPATIENT)
Dept: FAMILY MEDICINE | Facility: CLINIC | Age: 49
End: 2024-05-14
Payer: COMMERCIAL

## 2024-05-14 DIAGNOSIS — M89.9 BONE LESION: Primary | ICD-10-CM

## 2024-05-27 ASSESSMENT — PATIENT HEALTH QUESTIONNAIRE - PHQ9
10. IF YOU CHECKED OFF ANY PROBLEMS, HOW DIFFICULT HAVE THESE PROBLEMS MADE IT FOR YOU TO DO YOUR WORK, TAKE CARE OF THINGS AT HOME, OR GET ALONG WITH OTHER PEOPLE: SOMEWHAT DIFFICULT
SUM OF ALL RESPONSES TO PHQ QUESTIONS 1-9: 4
SUM OF ALL RESPONSES TO PHQ QUESTIONS 1-9: 4

## 2024-05-27 ASSESSMENT — ANXIETY QUESTIONNAIRES
8. IF YOU CHECKED OFF ANY PROBLEMS, HOW DIFFICULT HAVE THESE MADE IT FOR YOU TO DO YOUR WORK, TAKE CARE OF THINGS AT HOME, OR GET ALONG WITH OTHER PEOPLE?: SOMEWHAT DIFFICULT
GAD7 TOTAL SCORE: 3
6. BECOMING EASILY ANNOYED OR IRRITABLE: SEVERAL DAYS
3. WORRYING TOO MUCH ABOUT DIFFERENT THINGS: NOT AT ALL
GAD7 TOTAL SCORE: 3
5. BEING SO RESTLESS THAT IT IS HARD TO SIT STILL: NOT AT ALL
7. FEELING AFRAID AS IF SOMETHING AWFUL MIGHT HAPPEN: NOT AT ALL
GAD7 TOTAL SCORE: 3
1. FEELING NERVOUS, ANXIOUS, OR ON EDGE: SEVERAL DAYS
IF YOU CHECKED OFF ANY PROBLEMS ON THIS QUESTIONNAIRE, HOW DIFFICULT HAVE THESE PROBLEMS MADE IT FOR YOU TO DO YOUR WORK, TAKE CARE OF THINGS AT HOME, OR GET ALONG WITH OTHER PEOPLE: SOMEWHAT DIFFICULT
2. NOT BEING ABLE TO STOP OR CONTROL WORRYING: SEVERAL DAYS
7. FEELING AFRAID AS IF SOMETHING AWFUL MIGHT HAPPEN: NOT AT ALL
4. TROUBLE RELAXING: NOT AT ALL

## 2024-05-28 ENCOUNTER — OFFICE VISIT (OUTPATIENT)
Dept: FAMILY MEDICINE | Facility: CLINIC | Age: 49
End: 2024-05-28
Payer: COMMERCIAL

## 2024-05-28 VITALS
RESPIRATION RATE: 18 BRPM | DIASTOLIC BLOOD PRESSURE: 74 MMHG | SYSTOLIC BLOOD PRESSURE: 144 MMHG | WEIGHT: 209.9 LBS | BODY MASS INDEX: 34.97 KG/M2 | HEART RATE: 62 BPM | HEIGHT: 65 IN | TEMPERATURE: 97.5 F | OXYGEN SATURATION: 98 %

## 2024-05-28 DIAGNOSIS — R03.0 ELEVATED BP WITHOUT DIAGNOSIS OF HYPERTENSION: Primary | ICD-10-CM

## 2024-05-28 DIAGNOSIS — L30.1 DYSHIDROTIC ECZEMA: ICD-10-CM

## 2024-05-28 PROCEDURE — G2211 COMPLEX E/M VISIT ADD ON: HCPCS | Performed by: FAMILY MEDICINE

## 2024-05-28 PROCEDURE — 99213 OFFICE O/P EST LOW 20 MIN: CPT | Performed by: FAMILY MEDICINE

## 2024-05-28 RX ORDER — TRIAMCINOLONE ACETONIDE 1 MG/G
CREAM TOPICAL 2 TIMES DAILY
Qty: 45 G | Refills: 3 | Status: SHIPPED | OUTPATIENT
Start: 2024-05-28

## 2024-05-28 ASSESSMENT — PAIN SCALES - GENERAL: PAINLEVEL: NO PAIN (0)

## 2024-05-28 NOTE — PATIENT INSTRUCTIONS
ASSESSMENT AND PLAN  1. Elevated BP without diagnosis of hypertension  If applicable attempts at weight loss : Patient has been gradually losing weight since her last pregnancy. Was previously around 200lb, weight 209lb at today's visit.     If applicable attempts at smoking cessation : No smoking currently.      BP goal and home monitoring plan: Goal is <130/80. Patient will check her BP at least once a week at home.     Exercise goals: Patient wants to incorporate stretches that she has learned through physical therapy in the past.      Diet goals/changes , nutrition consult if desired: Discussed replacing large bottle of soda with a mini can or small bottle.     Weight loss goals/plan: Patient wants to continue to work towards a weight goal of 200lb.       Medication changes/plans: No addition of medication today.      Patient has checked BP at home twice since last visit: 127/69, 134/69. Patient will check BP at home, record readings, and bring to Nurse Only BP visit on 6/25/24. Patient will also bring her BP cuff to visit to verify it is reading accurately.     If home bps consistently above goal discussed first-line medications including ca channel blockers (will avoid for now due to high incidence of leg swelling) and consider diuretic (but already urinates frequently) and more likely ARB (which best to protect kidneys but does require potassium re-check 3-4 weeks after starting).  I will receive update after nurse blood pressue check as above.     Pt education materials provided and discussed regarding above.          2. Dyshidrotic eczema  Ointment working quite well but too greasy during the day.  Sent in cream as well to use during the day.   - triamcinolone (KENALOG) 0.1 % external cream; Apply topically 2 times daily (For up to two weeks at a time)  Dispense: 45 g; Refill: 3

## 2024-05-28 NOTE — PROGRESS NOTES
ASSESSMENT AND PLAN  1. Elevated BP without diagnosis of hypertension  If applicable attempts at weight loss : Patient has been gradually losing weight since her last pregnancy. Was previously around 200lb, weight 209lb at today's visit.     If applicable attempts at smoking cessation : No smoking currently.      BP goal and home monitoring plan: Goal is <130/80. Patient will check her BP at least once a week at home.     Exercise goals: Patient wants to incorporate stretches that she has learned through physical therapy in the past.      Diet goals/changes , nutrition consult if desired: Discussed replacing large bottle of soda with a mini can or small bottle.     Weight loss goals/plan: Patient wants to continue to work towards a weight goal of 200lb.       Medication changes/plans: No addition of medication today.      Patient has checked BP at home twice since last visit: 127/69, 134/69. Patient will check BP at home, record readings, and bring to Nurse Only BP visit on 6/25/24. Patient will also bring her BP cuff to visit to verify it is reading accurately.     If home bps consistently above goal discussed first-line medications including ca channel blockers (will avoid for now due to high incidence of leg swelling) and consider diuretic (but already urinates frequently) and more likely ARB (which best to protect kidneys but does require potassium re-check 3-4 weeks after starting).  I will receive update after nurse blood pressue check as above.     Pt education materials provided and discussed regarding above.          2. Dyshidrotic eczema  Ointment working quite well but too greasy during the day.  Sent in cream as well to use during the day.   - triamcinolone (KENALOG) 0.1 % external cream; Apply topically 2 times daily (For up to two weeks at a time)  Dispense: 45 g; Refill: 3            Subjective   Sabrina is a 48 year old, presenting for the following health issues:  Hypertension        5/28/2024     "10:14 AM   Additional Questions   Roomed by Keira CLARK     History of Present Illness       Hypertension: She presents for follow up of hypertension.  She does not check blood pressure  regularly outside of the clinic. Outside blood pressures have been over 140/90. She does not follow a low salt diet.     She eats 4 or more servings of fruits and vegetables daily.She consumes 0 sweetened beverage(s) daily.She exercises with enough effort to increase her heart rate 10 to 19 minutes per day.  She exercises with enough effort to increase her heart rate 3 or less days per week.   She is taking medications regularly.       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 Fibromyalgia and fatigue is a barrier to incorporating more strenuous exercise.     Description of diet in general and salt content: Patient is already eating a variety of fruits and vegetables, bakes her own bread at home, limits salt/uses less while cooking. Patient will have 1-2 sodas a week, usually during the weekends.     Medication: Patient is not currently on any medication for hypertension.        25 minutes spent on the date of the encounter doing chart review, history and exam, negotiating and explaining the plan with the patient, documentation and further activities as noted above.         The longitudinal plan of care for the diagnosis(es)/condition(s) as documented were addressed during this visit. Due to the added complexity in care, I will continue to support Sabrina in the subsequent management and with ongoing continuity of care.            Objective    BP (!) 144/74   Pulse 62   Temp 97.5  F (36.4  C) (Temporal)   Resp 18   Ht 1.645 m (5' 4.75\")   Wt 95.2 kg (209 lb 14.4 oz)   LMP 07/11/2018 (Exact Date)   SpO2 98%   Breastfeeding No   BMI 35.20 kg/m    Body mass index is 35.2 kg/m .  Physical Exam   Appears well.           Signed Electronically by: " Joel Daniel Wegener, MD

## 2024-06-09 ENCOUNTER — HOSPITAL ENCOUNTER (OUTPATIENT)
Dept: MRI IMAGING | Facility: CLINIC | Age: 49
Discharge: HOME | End: 2024-06-09
Attending: FAMILY MEDICINE | Admitting: FAMILY MEDICINE
Payer: COMMERCIAL

## 2024-06-09 DIAGNOSIS — M89.9 BONE LESION: ICD-10-CM

## 2024-06-09 PROCEDURE — 73721 MRI JNT OF LWR EXTRE W/O DYE: CPT | Mod: LT

## 2024-06-09 PROCEDURE — 73721 MRI JNT OF LWR EXTRE W/O DYE: CPT | Mod: 26 | Performed by: RADIOLOGY

## 2024-06-26 ENCOUNTER — ALLIED HEALTH/NURSE VISIT (OUTPATIENT)
Dept: FAMILY MEDICINE | Facility: CLINIC | Age: 49
End: 2024-06-26
Payer: COMMERCIAL

## 2024-06-26 VITALS — SYSTOLIC BLOOD PRESSURE: 160 MMHG | DIASTOLIC BLOOD PRESSURE: 78 MMHG

## 2024-06-26 DIAGNOSIS — R03.0 ELEVATED BP WITHOUT DIAGNOSIS OF HYPERTENSION: Primary | ICD-10-CM

## 2024-06-26 PROCEDURE — 99207 PR NO CHARGE NURSE ONLY: CPT

## 2024-06-26 ASSESSMENT — PATIENT HEALTH QUESTIONNAIRE - PHQ9
SUM OF ALL RESPONSES TO PHQ QUESTIONS 1-9: 5
10. IF YOU CHECKED OFF ANY PROBLEMS, HOW DIFFICULT HAVE THESE PROBLEMS MADE IT FOR YOU TO DO YOUR WORK, TAKE CARE OF THINGS AT HOME, OR GET ALONG WITH OTHER PEOPLE: VERY DIFFICULT
SUM OF ALL RESPONSES TO PHQ QUESTIONS 1-9: 5

## 2024-06-26 NOTE — PROGRESS NOTES
I met with Sabrina Mccoy at the request of Dr Wegener to recheck her blood pressure.  Blood pressure medications on the med list were reviewed with patient.    Patient has taken all medications as per usual regimen: NA  Patient reports tolerating them without any issues or concerns: NA    Vitals:    06/26/24 0819 06/26/24 0831   BP: (!) 148/84 (!) 160/78       After 5 minutes, the patient's blood pressure remained greater than or equal to 140/90.    Is the patient currently having any chest pain? No  Does the patient currently have a headache? No  Does the patient currently have any vision changes? No  Does the patient currently have any nausea? No  Does the patient currently have any abdominal pain? No    The previous encounter was reviewed.  The patient was discharged and the note will be sent to the provider for final review.         Patient brought in home BP cuff. Readings taken after readings above were 167/100 and 166/102. Patient stated her BP cuff was previously used by her mother and was more than 5 years old. Instructed patient to  a new BP cuff and continue checking at home.     Patient brought in home BP readings:   5/28 - 118/86, 136/107.  5/29 - 124/73, 118/64.   5/30 - 127/69  6/3 - 118/62, 128/82.   6/7 - 129/81  6/12 - 142/70, 128/73  6/18 - 115/63, 134/72.

## 2024-06-26 NOTE — Clinical Note
Instructed patient to buy a new BP cuff, hers was used by her mother and is more than 5 years old. Patient is reluctant to start medications, but is open to medication if that's what you advise. Home BP readings look good, but her cuff was inaccurate compared to manual readings here in the clinic.   Patient said devang is ok for communicating what the plan should be.

## 2025-06-14 ENCOUNTER — HEALTH MAINTENANCE LETTER (OUTPATIENT)
Age: 50
End: 2025-06-14

## (undated) DEVICE — TUBING SUCTION MEDI-VAC 1/4"X20' N620A

## (undated) DEVICE — ENDO FORCEP SPIKED SERRATED SHAFT JUMBO 239CM G56998

## (undated) DEVICE — SOL WATER IRRIG 500ML BOTTLE 2F7113

## (undated) DEVICE — SUCTION MANIFOLD NEPTUNE 2 SYS 1 PORT 702-025-000

## (undated) DEVICE — SNARE CAPIVATOR ROUND COLD SNR BX10 M00561101

## (undated) DEVICE — KIT ENDO TURNOVER/PROCEDURE CARRY-ON 101822

## (undated) DEVICE — SPECIMEN CONTAINER 3OZ W/FORMALIN 59901

## (undated) DEVICE — GOWN IMPERVIOUS 2XL BLUE